# Patient Record
Sex: FEMALE | Race: WHITE | NOT HISPANIC OR LATINO | Employment: OTHER | URBAN - METROPOLITAN AREA
[De-identification: names, ages, dates, MRNs, and addresses within clinical notes are randomized per-mention and may not be internally consistent; named-entity substitution may affect disease eponyms.]

---

## 2017-12-14 ENCOUNTER — HOSPITAL ENCOUNTER (OUTPATIENT)
Dept: RADIOLOGY | Facility: HOSPITAL | Age: 72
Discharge: HOME | End: 2017-12-14
Attending: OBSTETRICS & GYNECOLOGY
Payer: MEDICARE

## 2017-12-14 DIAGNOSIS — Z12.39 ENCOUNTER FOR SPECIAL SCREENING EXAMINATION FOR NEOPLASM OF BREAST: ICD-10-CM

## 2017-12-14 PROCEDURE — 77063 BREAST TOMOSYNTHESIS BI: CPT | Mod: 26,,, | Performed by: RADIOLOGY

## 2017-12-14 PROCEDURE — 77067 SCR MAMMO BI INCL CAD: CPT | Mod: 26,,, | Performed by: RADIOLOGY

## 2017-12-14 PROCEDURE — 77067 SCR MAMMO BI INCL CAD: CPT | Mod: TC,PO

## 2018-12-17 ENCOUNTER — HOSPITAL ENCOUNTER (OUTPATIENT)
Dept: RADIOLOGY | Facility: HOSPITAL | Age: 73
Discharge: HOME | End: 2018-12-17
Attending: OBSTETRICS & GYNECOLOGY
Payer: MEDICARE

## 2018-12-17 DIAGNOSIS — Z12.39 ENCOUNTER FOR SPECIAL SCREENING EXAMINATION FOR NEOPLASM OF BREAST: ICD-10-CM

## 2018-12-17 PROCEDURE — 77063 BREAST TOMOSYNTHESIS BI: CPT | Mod: TC,PO

## 2018-12-17 PROCEDURE — 77063 BREAST TOMOSYNTHESIS BI: CPT | Mod: 26,,, | Performed by: RADIOLOGY

## 2018-12-17 PROCEDURE — 77067 SCR MAMMO BI INCL CAD: CPT | Mod: TC,PO

## 2018-12-17 PROCEDURE — 77067 SCR MAMMO BI INCL CAD: CPT | Mod: 26,,, | Performed by: RADIOLOGY

## 2018-12-20 ENCOUNTER — TELEPHONE (OUTPATIENT)
Dept: RADIOLOGY | Facility: HOSPITAL | Age: 73
End: 2018-12-20

## 2019-12-21 ENCOUNTER — HOSPITAL ENCOUNTER (OUTPATIENT)
Dept: RADIOLOGY | Facility: HOSPITAL | Age: 74
Discharge: HOME | End: 2019-12-21
Attending: OBSTETRICS & GYNECOLOGY
Payer: MEDICARE

## 2019-12-21 DIAGNOSIS — Z12.31 ENCOUNTER FOR SCREENING MAMMOGRAM FOR MALIGNANT NEOPLASM OF BREAST: ICD-10-CM

## 2019-12-21 PROCEDURE — 77067 SCR MAMMO BI INCL CAD: CPT | Mod: 26,,, | Performed by: RADIOLOGY

## 2019-12-21 PROCEDURE — 77063 BREAST TOMOSYNTHESIS BI: CPT | Mod: TC,PO

## 2019-12-21 PROCEDURE — 77063 MAMMO DIGITAL SCREENING BILAT WITH TOMOSYNTHESIS_CAD: ICD-10-PCS | Mod: 26,,, | Performed by: RADIOLOGY

## 2019-12-21 PROCEDURE — 77063 BREAST TOMOSYNTHESIS BI: CPT | Mod: 26,,, | Performed by: RADIOLOGY

## 2019-12-21 PROCEDURE — 77067 MAMMO DIGITAL SCREENING BILAT WITH TOMOSYNTHESIS_CAD: ICD-10-PCS | Mod: 26,,, | Performed by: RADIOLOGY

## 2020-11-24 RX ORDER — AMLODIPINE BESYLATE 2.5 MG/1
TABLET ORAL
COMMUNITY
Start: 2020-09-14 | End: 2020-11-24 | Stop reason: SDUPTHER

## 2020-11-24 RX ORDER — AMLODIPINE BESYLATE 2.5 MG/1
2.5 TABLET ORAL 2 TIMES DAILY
Qty: 180 TABLET | Refills: 3 | Status: SHIPPED | OUTPATIENT
Start: 2020-11-24 | End: 2021-11-30

## 2020-12-04 ENCOUNTER — OFFICE VISIT (OUTPATIENT)
Dept: CARDIOLOGY | Facility: CLINIC | Age: 75
End: 2020-12-04
Payer: MEDICARE

## 2020-12-04 VITALS
RESPIRATION RATE: 16 BRPM | WEIGHT: 158 LBS | HEART RATE: 68 BPM | BODY MASS INDEX: 31.02 KG/M2 | SYSTOLIC BLOOD PRESSURE: 128 MMHG | OXYGEN SATURATION: 98 % | HEIGHT: 60 IN | DIASTOLIC BLOOD PRESSURE: 80 MMHG

## 2020-12-04 DIAGNOSIS — F31.9 BIPOLAR 1 DISORDER: ICD-10-CM

## 2020-12-04 DIAGNOSIS — K22.4 ESOPHAGEAL SPASM: ICD-10-CM

## 2020-12-04 DIAGNOSIS — I25.119 ATHEROSCLEROSIS OF NATIVE CORONARY ARTERY OF NATIVE HEART WITH ANGINA PECTORIS: ICD-10-CM

## 2020-12-04 DIAGNOSIS — I10 ESSENTIAL HYPERTENSION: ICD-10-CM

## 2020-12-04 DIAGNOSIS — E11.40 TYPE 2 DIABETES MELLITUS WITH DIABETIC NEUROPATHY, WITHOUT LONG-TERM CURRENT USE OF INSULIN: ICD-10-CM

## 2020-12-04 DIAGNOSIS — K22.2 ESOPHAGEAL STRICTURE: ICD-10-CM

## 2020-12-04 DIAGNOSIS — Z00.00 ANNUAL PHYSICAL EXAM: ICD-10-CM

## 2020-12-04 DIAGNOSIS — E78.2 MIXED HYPERLIPIDEMIA: ICD-10-CM

## 2020-12-04 DIAGNOSIS — R00.2 PALPITATIONS: Primary | ICD-10-CM

## 2020-12-04 DIAGNOSIS — R94.31 NONSPECIFIC ABNORMAL ELECTROCARDIOGRAM (ECG) (EKG): ICD-10-CM

## 2020-12-04 DIAGNOSIS — E03.9 ACQUIRED HYPOTHYROIDISM: ICD-10-CM

## 2020-12-04 DIAGNOSIS — Z95.1 HX OF CABG: ICD-10-CM

## 2020-12-04 DIAGNOSIS — E08.65 DIABETES MELLITUS DUE TO UNDERLYING CONDITION WITH HYPERGLYCEMIA, WITHOUT LONG-TERM CURRENT USE OF INSULIN: ICD-10-CM

## 2020-12-04 PROCEDURE — 99204 PR OFFICE/OUTPT VISIT, NEW, LEVL IV, 45-59 MIN: ICD-10-PCS | Mod: 25,S$GLB,, | Performed by: INTERNAL MEDICINE

## 2020-12-04 PROCEDURE — 93000 ELECTROCARDIOGRAM COMPLETE: CPT | Mod: S$GLB,,, | Performed by: INTERNAL MEDICINE

## 2020-12-04 PROCEDURE — 93000 EKG 12-LEAD: ICD-10-PCS | Mod: S$GLB,,, | Performed by: INTERNAL MEDICINE

## 2020-12-04 PROCEDURE — 99204 OFFICE O/P NEW MOD 45 MIN: CPT | Mod: 25,S$GLB,, | Performed by: INTERNAL MEDICINE

## 2020-12-04 RX ORDER — ATENOLOL 25 MG/1
TABLET ORAL
Qty: 90 TABLET | Refills: 2 | Status: SHIPPED | OUTPATIENT
Start: 2020-12-04 | End: 2021-11-29

## 2020-12-04 RX ORDER — ATORVASTATIN CALCIUM 40 MG/1
40 TABLET, FILM COATED ORAL NIGHTLY
COMMUNITY
Start: 2020-11-24 | End: 2023-06-29 | Stop reason: SDUPTHER

## 2020-12-04 RX ORDER — SITAGLIPTIN AND METFORMIN HYDROCHLORIDE 500; 50 MG/1; MG/1
50-500 TABLET, FILM COATED ORAL DAILY
COMMUNITY
Start: 2020-11-24 | End: 2022-01-21 | Stop reason: ALTCHOICE

## 2020-12-04 RX ORDER — LOSARTAN POTASSIUM 50 MG/1
50 TABLET ORAL DAILY
COMMUNITY
Start: 2020-11-24 | End: 2024-01-05 | Stop reason: SDUPTHER

## 2020-12-04 RX ORDER — DULOXETIN HYDROCHLORIDE 30 MG/1
30 CAPSULE, DELAYED RELEASE ORAL NIGHTLY
COMMUNITY
Start: 2020-11-24

## 2020-12-04 RX ORDER — ALPRAZOLAM 1 MG/1
1 TABLET ORAL DAILY
COMMUNITY
Start: 2020-11-05

## 2020-12-04 RX ORDER — NITROGLYCERIN 0.4 MG/1
0.4 TABLET SUBLINGUAL EVERY 5 MIN PRN
Qty: 30 TABLET | Refills: 6 | Status: SHIPPED | OUTPATIENT
Start: 2020-12-04 | End: 2024-01-05 | Stop reason: SDUPTHER

## 2020-12-04 RX ORDER — CLOPIDOGREL BISULFATE 75 MG/1
75 TABLET ORAL DAILY
COMMUNITY
Start: 2020-11-24 | End: 2021-02-22

## 2020-12-04 RX ORDER — LEVOTHYROXINE SODIUM 88 UG/1
88 TABLET ORAL DAILY
COMMUNITY
Start: 2020-09-14

## 2020-12-04 RX ORDER — GLIMEPIRIDE 1 MG/1
1 TABLET ORAL DAILY
COMMUNITY
Start: 2020-10-06

## 2020-12-04 NOTE — PROGRESS NOTES
Subjective:    Patient ID:  Maylin Birmingham is a 75 y.o. female who presents for follow-up of   Chief Complaint   Patient presents with    Hypertension       HPI:   Ms Maylin Birmingham is a 75 y.o. female is here for a follow-up.  She has been doing well no specific complaints at the present time.  Her breathing is good denies any shortness of breath but she has mild dyspnea on exertion.  Denies any chest pain or tightness or heaviness.  But she occasionally has fluttering in the chest.  And her fluttering sometimes bothers her.  Denies any dizziness or lightheadedness denies any loss of consciousness falls or head injury she had 1 episode where she felt very dizzy and laid there for a couple of hours are till her dizziness went away.  After that she has not had any other episodes.  Patient has esophageal spasm and when she has a spasm the thing that helps her the most is nitroglycerin sublingual tablets and Sprite.    Review of patient's allergies indicates:  No Known Allergies    Past Medical History:   Diagnosis Date    Bradycardia     CAD (coronary artery disease)     Diabetes     HTN (hypertension)      Past Surgical History:   Procedure Laterality Date    INSERT / REPLACE / REMOVE PACEMAKER       Social History     Tobacco Use    Smoking status: Never Smoker    Smokeless tobacco: Never Used   Substance Use Topics    Alcohol use: Never     Frequency: Never    Drug use: Never     Family History   Problem Relation Age of Onset    Breast cancer Maternal Aunt     Breast cancer Sister     Breast cancer Sister     Diabetes Mother         Review of Systems:   Constitution: Negative for diaphoresis and fever.   HEENT: Negative for nosebleeds.    Cardiovascular: Negative for chest pain       No dyspnea on exertion       No leg swelling        Intermittent palpitations  Respiratory: Negative for shortness of breath and wheezing.    Hematologic/Lymphatic: Negative for bleeding problem. Does not bruise/bleed  easily.   Skin: Negative for color change and rash.   Musculoskeletal: Negative for falls and myalgias.   Gastrointestinal: Negative for hematemesis and hematochezia.   Genitourinary: Negative for hematuria.   Neurological: Negative for dizziness and light-headedness.   Psychiatric/Behavioral: Negative for altered mental status and memory loss.          Objective:        Vitals:    12/04/20 1011   BP: 128/80   Pulse: 68   Resp: 16       No results found for: WBC, HGB, HCT, PLT, CHOL, TRIG, HDL, LDLDIRECT, ALT, AST, NA, K, CL, CREATININE, BUN, CO2, TSH, PSA, INR, GLUF, HGBA1C, MICROALBUR     ECHOCARDIOGRAM RESULTS  No results found for this or any previous visit.      CURRENT/PREVIOUS VISIT EKG  No results found for this or any previous visit.  No procedure found.   No results found for this or any previous visit.    Physical Exam:  CONSTITUTIONAL: No fever, no chills  HEENT: Normocephalic, atraumatic,pupils reactive to light                 NECK:  No JVD no carotid bruit  CVS: S1S2+, RRR, no murmurs,   LUNGS: Clear  ABDOMEN: Soft, NT, BS+  EXTREMITIES: No cyanosis, edema  : No olivia catheter  NEURO: AAO X 3  PSY: Normal affect      Medication List with Changes/Refills   Current Medications    ALPRAZOLAM (XANAX) 1 MG TABLET    Take 1 mg by mouth once daily.    AMLODIPINE (NORVASC) 2.5 MG TABLET    Take 1 tablet (2.5 mg total) by mouth 2 (two) times daily.    ATENOLOL (TENORMIN) 25 MG TABLET    TAKE (1) TABLET BY MOUTH ONCE DAILY.    ATORVASTATIN (LIPITOR) 40 MG TABLET    Take 40 mg by mouth every evening.    CLOPIDOGREL (PLAVIX) 75 MG TABLET    Take 75 mg by mouth once daily.    DULOXETINE (CYMBALTA) 30 MG CAPSULE    Take 30 mg by mouth every evening.    GLIMEPIRIDE (AMARYL) 1 MG TABLET    Take 1 mg by mouth once daily.    JANUMET  MG PER TABLET    Take  tablets by mouth once daily.    LOSARTAN (COZAAR) 50 MG TABLET    Take 50 mg by mouth once daily.    SYNTHROID 88 MCG TABLET    Take 88 mcg by mouth  once daily.         Assessment:       1. Palpitations    2. Esophageal spasm    3. Type 2 diabetes mellitus with diabetic neuropathy, without long-term current use of insulin    4. Atherosclerosis of native coronary artery of native heart with angina pectoris    5. Hx of CABG    6. Mixed hyperlipidemia    7. Diabetes mellitus due to underlying condition with hyperglycemia, without long-term current use of insulin    8. Essential hypertension    9. Nonspecific abnormal electrocardiogram (ECG) (EKG)    10. Acquired hypothyroidism    11. Esophageal stricture    12. Annual physical exam    13. Bipolar 1 disorder         Plan:    1.  Patient is doing very well her blood pressure is well controlled 128/80.  And she is currently on amlodipine 2.5 mg p.o. Q b.i.d. and 10 or min 25 mg p.o. q.day. Continue the same she is also on losartan 50 mg p.o. q.day continue the same.  2.  She is on Amaryl 1 mg p.o. q.day for her diabetes and also Janumet  mg tablet.  Continue the same.  Her neuropathy is still creating a problem for her.  Patient is taking vitamin B12 and it has helped a little bit.  3.  She is stable from her coronary artery disease status.  Will schedule her for a 2D echocardiogram for LV function ejection fraction and wall motion abnormality.  Patient has intermittent palpitations.  And will do a 24 hr Holter monitor.  She also has intermittent esophageal spasm which is relieved with sublingual nitroglycerin and Sprite.  Continue the same.  4.  She is currently on Synthroid 88 mcg p.o. q.day continue the same and primary physician Dr. billingsley checking her TSH and free T4 levels.  5.  She is currently on Plavix 75 mg p.o. q.day.  Continue the same no bleeding issues no blood in the stool or urine  6.  She takes Xanax 1 mg tablet as needed for her anxiety.  7.  She is on Cymbalta 30 mg p.o. q.day continue the same.  She is tolerating it well.  Her QT interval is within normal limits measuring QT is 424 and  QTC is 450 milliseconds.  8.  Patient to continue to exercise and walk on regular basis.  And I will see her back in the office in 6 months.        Problem List Items Addressed This Visit        Psychiatric    Bipolar 1 disorder       Cardiac/Vascular    Atherosclerosis of native coronary artery of native heart with angina pectoris    Hx of CABG    Mixed hyperlipidemia    Essential hypertension    Nonspecific abnormal electrocardiogram (ECG) (EKG)       Endocrine    Type 2 diabetes mellitus with diabetic neuropathy, without long-term current use of insulin    Relevant Medications    glimepiride (AMARYL) 1 MG tablet    JANUMET  mg per tablet    Diabetes mellitus due to underlying condition with hyperglycemia, without long-term current use of insulin    Relevant Medications    glimepiride (AMARYL) 1 MG tablet    JANUMET  mg per tablet    Acquired hypothyroidism       GI    Esophageal stricture       Other    Annual physical exam    Relevant Orders    IN OFFICE EKG 12-LEAD (to [facility])      Other Visit Diagnoses     Palpitations    -  Primary    Esophageal spasm              Follow up in about 6 months (around 6/4/2021).

## 2020-12-22 ENCOUNTER — HOSPITAL ENCOUNTER (OUTPATIENT)
Dept: RADIOLOGY | Facility: HOSPITAL | Age: 75
Discharge: HOME | End: 2020-12-22
Attending: OBSTETRICS & GYNECOLOGY
Payer: MEDICARE

## 2020-12-22 DIAGNOSIS — Z12.31 SCREENING MAMMOGRAM, ENCOUNTER FOR: ICD-10-CM

## 2020-12-22 PROCEDURE — 77067 SCR MAMMO BI INCL CAD: CPT | Mod: TC,PO

## 2020-12-22 PROCEDURE — 77063 MAMMO DIGITAL SCREENING BILAT WITH TOMO: ICD-10-PCS | Mod: 26,,, | Performed by: RADIOLOGY

## 2020-12-22 PROCEDURE — 77067 MAMMO DIGITAL SCREENING BILAT WITH TOMO: ICD-10-PCS | Mod: 26,,, | Performed by: RADIOLOGY

## 2020-12-22 PROCEDURE — 77063 BREAST TOMOSYNTHESIS BI: CPT | Mod: 26,,, | Performed by: RADIOLOGY

## 2020-12-22 PROCEDURE — 77067 SCR MAMMO BI INCL CAD: CPT | Mod: 26,,, | Performed by: RADIOLOGY

## 2021-01-22 DIAGNOSIS — I25.119 ATHEROSCLEROSIS OF NATIVE CORONARY ARTERY OF NATIVE HEART WITH ANGINA PECTORIS: Primary | ICD-10-CM

## 2021-03-08 PROBLEM — Z00.00 ANNUAL PHYSICAL EXAM: Status: RESOLVED | Noted: 2020-12-04 | Resolved: 2021-03-08

## 2021-05-20 ENCOUNTER — HOSPITAL ENCOUNTER (OUTPATIENT)
Dept: CARDIOLOGY | Facility: CLINIC | Age: 76
Discharge: HOME | End: 2021-05-20
Attending: INTERNAL MEDICINE
Payer: MEDICARE

## 2021-05-20 VITALS — BODY MASS INDEX: 31.02 KG/M2 | WEIGHT: 158 LBS | HEIGHT: 60 IN

## 2021-05-20 DIAGNOSIS — I25.119 ATHEROSCLEROSIS OF NATIVE CORONARY ARTERY OF NATIVE HEART WITH ANGINA PECTORIS: ICD-10-CM

## 2021-05-20 PROCEDURE — 93306 ECHO (CUPID ONLY): ICD-10-PCS | Mod: S$GLB,,, | Performed by: INTERNAL MEDICINE

## 2021-05-20 PROCEDURE — 93306 TTE W/DOPPLER COMPLETE: CPT | Mod: S$GLB,,, | Performed by: INTERNAL MEDICINE

## 2021-05-20 RX ORDER — CLOPIDOGREL BISULFATE 75 MG/1
TABLET ORAL
Qty: 90 TABLET | Refills: 3 | Status: SHIPPED | OUTPATIENT
Start: 2021-05-20 | End: 2022-07-02

## 2021-06-11 ENCOUNTER — OFFICE VISIT (OUTPATIENT)
Dept: CARDIOLOGY | Facility: CLINIC | Age: 76
End: 2021-06-11
Payer: MEDICARE

## 2021-06-11 VITALS
OXYGEN SATURATION: 98 % | RESPIRATION RATE: 16 BRPM | WEIGHT: 158 LBS | HEIGHT: 60 IN | DIASTOLIC BLOOD PRESSURE: 84 MMHG | BODY MASS INDEX: 31.02 KG/M2 | SYSTOLIC BLOOD PRESSURE: 136 MMHG | HEART RATE: 61 BPM

## 2021-06-11 DIAGNOSIS — F31.9 BIPOLAR 1 DISORDER: ICD-10-CM

## 2021-06-11 DIAGNOSIS — I07.1 MODERATE TRICUSPID REGURGITATION: ICD-10-CM

## 2021-06-11 DIAGNOSIS — I27.29 OTHER SECONDARY PULMONARY HYPERTENSION: Primary | ICD-10-CM

## 2021-06-11 DIAGNOSIS — K22.2 ESOPHAGEAL STRICTURE: ICD-10-CM

## 2021-06-11 DIAGNOSIS — I51.7 MILD LEFT ATRIAL ENLARGEMENT: ICD-10-CM

## 2021-06-11 DIAGNOSIS — E11.40 TYPE 2 DIABETES MELLITUS WITH DIABETIC NEUROPATHY, WITHOUT LONG-TERM CURRENT USE OF INSULIN: ICD-10-CM

## 2021-06-11 DIAGNOSIS — I10 ESSENTIAL HYPERTENSION: ICD-10-CM

## 2021-06-11 DIAGNOSIS — R94.31 NONSPECIFIC ABNORMAL ELECTROCARDIOGRAM (ECG) (EKG): ICD-10-CM

## 2021-06-11 DIAGNOSIS — E78.2 MIXED HYPERLIPIDEMIA: ICD-10-CM

## 2021-06-11 DIAGNOSIS — E03.9 ACQUIRED HYPOTHYROIDISM: ICD-10-CM

## 2021-06-11 DIAGNOSIS — Z95.1 HX OF CABG: ICD-10-CM

## 2021-06-11 DIAGNOSIS — I25.119 ATHEROSCLEROSIS OF NATIVE CORONARY ARTERY OF NATIVE HEART WITH ANGINA PECTORIS: ICD-10-CM

## 2021-06-11 DIAGNOSIS — I51.7 RIGHT ATRIAL ENLARGEMENT: ICD-10-CM

## 2021-06-11 PROCEDURE — 99214 PR OFFICE/OUTPT VISIT, EST, LEVL IV, 30-39 MIN: ICD-10-PCS | Mod: S$GLB,,, | Performed by: INTERNAL MEDICINE

## 2021-06-11 PROCEDURE — 99214 OFFICE O/P EST MOD 30 MIN: CPT | Mod: S$GLB,,, | Performed by: INTERNAL MEDICINE

## 2021-09-02 LAB
AORTIC ROOT ANNULUS: 3.31 CM
AORTIC VALVE CUSP SEPERATION: 1.55 CM
AV INDEX (PROSTH): 0.87
AV MEAN GRADIENT: 2 MMHG
AV PEAK GRADIENT: 3 MMHG
AV VALVE AREA: 3.11 CM2
AV VELOCITY RATIO: 1
BSA FOR ECHO PROCEDURE: 1.74 M2
CV ECHO LV RWT: 0.4 CM
DOP CALC AO PEAK VEL: 0.8 M/S
DOP CALC AO VTI: 21.45 CM
DOP CALC LVOT AREA: 3.6 CM2
DOP CALC LVOT DIAMETER: 2.13 CM
DOP CALC LVOT PEAK VEL: 0.8 M/S
DOP CALC LVOT STROKE VOLUME: 66.71 CM3
DOP CALCLVOT PEAK VEL VTI: 18.73 CM
E WAVE DECELERATION TIME: 162.39 MSEC
E/A RATIO: 1.36
ECHO LV POSTERIOR WALL: 0.71 CM (ref 0.6–1.1)
EJECTION FRACTION: 70 %
INTERVENTRICULAR SEPTUM: 0.74 CM (ref 0.6–1.1)
IVRT: 110.73 MSEC
LEFT ATRIUM SIZE: 3.37 CM
LEFT VENTRICLE DIASTOLIC VOLUME INDEX: 31.4 ML/M2
LEFT VENTRICLE DIASTOLIC VOLUME: 53.06 ML
LEFT VENTRICLE MASS INDEX: 40 G/M2
LEFT VENTRICULAR INTERNAL DIMENSION IN DIASTOLE: 3.56 CM (ref 3.5–6)
LEFT VENTRICULAR MASS: 67.69 G
MV PEAK A VEL: 0.66 M/S
MV PEAK E VEL: 0.9 M/S
MV STENOSIS PRESSURE HALF TIME: 47.09 MS
MV VALVE AREA P 1/2 METHOD: 4.67 CM2
PISA TR MAX VEL: 3.03 M/S
RIGHT VENTRICULAR END-DIASTOLIC DIMENSION: 1.93 CM
TR MAX PG: 37 MMHG

## 2021-12-17 ENCOUNTER — OFFICE VISIT (OUTPATIENT)
Dept: OBSTETRICS AND GYNECOLOGY | Facility: CLINIC | Age: 76
End: 2021-12-17
Payer: MEDICARE

## 2021-12-17 VITALS
BODY MASS INDEX: 30.31 KG/M2 | DIASTOLIC BLOOD PRESSURE: 76 MMHG | HEIGHT: 60 IN | WEIGHT: 154.38 LBS | SYSTOLIC BLOOD PRESSURE: 124 MMHG

## 2021-12-17 DIAGNOSIS — Z01.419 ENCOUNTER FOR WELL WOMAN EXAM WITH ROUTINE GYNECOLOGICAL EXAM: Primary | ICD-10-CM

## 2021-12-17 PROCEDURE — G0101 PR CA SCREEN;PELVIC/BREAST EXAM: ICD-10-PCS | Mod: S$GLB,,, | Performed by: OBSTETRICS & GYNECOLOGY

## 2021-12-17 PROCEDURE — G0101 CA SCREEN;PELVIC/BREAST EXAM: HCPCS | Mod: S$GLB,,, | Performed by: OBSTETRICS & GYNECOLOGY

## 2021-12-17 RX ORDER — IBUPROFEN 600 MG/1
600 TABLET ORAL 3 TIMES DAILY
COMMUNITY
Start: 2021-09-09

## 2021-12-17 RX ORDER — EMPAGLIFLOZIN 10 MG/1
10 TABLET, FILM COATED ORAL DAILY
COMMUNITY
Start: 2021-11-16 | End: 2022-01-21 | Stop reason: ALTCHOICE

## 2021-12-27 ENCOUNTER — HOSPITAL ENCOUNTER (OUTPATIENT)
Dept: RADIOLOGY | Facility: CLINIC | Age: 76
Discharge: HOME | End: 2021-12-27
Payer: MEDICARE

## 2021-12-27 DIAGNOSIS — Z12.31 ENCOUNTER FOR SCREENING MAMMOGRAM FOR MALIGNANT NEOPLASM OF BREAST: ICD-10-CM

## 2021-12-27 PROCEDURE — 77063 BREAST TOMOSYNTHESIS BI: CPT | Mod: S$GLB,,, | Performed by: RADIOLOGY

## 2021-12-27 PROCEDURE — 77063 MAMMO DIGITAL SCREENING BILAT WITH TOMO: ICD-10-PCS | Mod: S$GLB,,, | Performed by: RADIOLOGY

## 2021-12-27 PROCEDURE — 77067 SCR MAMMO BI INCL CAD: CPT | Mod: S$GLB,,, | Performed by: RADIOLOGY

## 2021-12-27 PROCEDURE — 77067 MAMMO DIGITAL SCREENING BILAT WITH TOMO: ICD-10-PCS | Mod: S$GLB,,, | Performed by: RADIOLOGY

## 2022-01-21 ENCOUNTER — OFFICE VISIT (OUTPATIENT)
Dept: CARDIOLOGY | Facility: CLINIC | Age: 77
End: 2022-01-21
Payer: MEDICARE

## 2022-01-21 VITALS
DIASTOLIC BLOOD PRESSURE: 72 MMHG | HEART RATE: 72 BPM | BODY MASS INDEX: 29.45 KG/M2 | SYSTOLIC BLOOD PRESSURE: 126 MMHG | WEIGHT: 150 LBS | HEIGHT: 60 IN | OXYGEN SATURATION: 100 %

## 2022-01-21 DIAGNOSIS — I07.1 MODERATE TRICUSPID REGURGITATION: ICD-10-CM

## 2022-01-21 DIAGNOSIS — E03.9 ACQUIRED HYPOTHYROIDISM: ICD-10-CM

## 2022-01-21 DIAGNOSIS — I10 ESSENTIAL HYPERTENSION: Primary | ICD-10-CM

## 2022-01-21 DIAGNOSIS — I25.119 ATHEROSCLEROSIS OF NATIVE CORONARY ARTERY OF NATIVE HEART WITH ANGINA PECTORIS: ICD-10-CM

## 2022-01-21 DIAGNOSIS — I51.7 MILD LEFT ATRIAL ENLARGEMENT: ICD-10-CM

## 2022-01-21 DIAGNOSIS — Z95.1 HX OF CABG: ICD-10-CM

## 2022-01-21 DIAGNOSIS — K22.2 ESOPHAGEAL STRICTURE: ICD-10-CM

## 2022-01-21 DIAGNOSIS — R94.31 NONSPECIFIC ABNORMAL ELECTROCARDIOGRAM (ECG) (EKG): ICD-10-CM

## 2022-01-21 DIAGNOSIS — E11.40 TYPE 2 DIABETES MELLITUS WITH DIABETIC NEUROPATHY, WITHOUT LONG-TERM CURRENT USE OF INSULIN: ICD-10-CM

## 2022-01-21 DIAGNOSIS — I27.29 OTHER SECONDARY PULMONARY HYPERTENSION: ICD-10-CM

## 2022-01-21 DIAGNOSIS — E78.2 MIXED HYPERLIPIDEMIA: ICD-10-CM

## 2022-01-21 PROCEDURE — 99214 OFFICE O/P EST MOD 30 MIN: CPT | Mod: S$GLB,,, | Performed by: INTERNAL MEDICINE

## 2022-01-21 PROCEDURE — 93000 ELECTROCARDIOGRAM COMPLETE: CPT | Mod: S$GLB,,, | Performed by: INTERNAL MEDICINE

## 2022-01-21 PROCEDURE — 93000 EKG 12-LEAD: ICD-10-PCS | Mod: S$GLB,,, | Performed by: INTERNAL MEDICINE

## 2022-01-21 PROCEDURE — 99214 PR OFFICE/OUTPT VISIT, EST, LEVL IV, 30-39 MIN: ICD-10-PCS | Mod: S$GLB,,, | Performed by: INTERNAL MEDICINE

## 2022-01-21 NOTE — PROGRESS NOTES
Subjective:    Patient ID:  Maylin Birmingham is a 76 y.o. female     Chief Complaint   Patient presents with    Follow-up     6 month     Hypertension    Hyperlipidemia       HPI:  Ms Maylin Birmingham is a 76 y.o. female is here for follow-up.  Patient has been doing well and patient had a cyst in the back of the neck or for which she was treated.  And also patient realized that she has lot more sensitive to the coloring of the pills and lately she has been having bladder issues.  She denies any chest pain or tightness or heaviness her breathing is good denies any shortness of breath or difficulty in breathing denies any dizziness or lightheadedness or loss of consciousness falls or head injury.  She has been taking all her medications regularly.  She was on Jardiance and could not tolerated it was changed to Trulicity.      Review of patient's allergies indicates:  No Known Allergies    Past Medical History:   Diagnosis Date    Bradycardia     CAD (coronary artery disease)     Diabetes     HTN (hypertension)      Past Surgical History:   Procedure Laterality Date    CATARACT EXTRACTION Bilateral     CORONARY ARTERY BYPASS GRAFT      HYSTERECTOMY      INSERT / REPLACE / REMOVE PACEMAKER       Social History     Tobacco Use    Smoking status: Never Smoker    Smokeless tobacco: Never Used   Substance Use Topics    Alcohol use: Never    Drug use: Never     Family History   Problem Relation Age of Onset    Breast cancer Maternal Aunt     Breast cancer Sister     Breast cancer Sister     Diabetes Mother         Review of Systems:   Constitution: Negative for diaphoresis and fever.   HEENT: Negative for nosebleeds.    Cardiovascular: Negative for chest pain       No dyspnea on exertion       No leg swelling        No palpitations  Respiratory: Negative for shortness of breath and wheezing.    Hematologic/Lymphatic: Negative for bleeding problem. Does not bruise/bleed easily.   Skin: Negative for color  change and rash.   Musculoskeletal: Negative for falls and myalgias.   Gastrointestinal: Negative for hematemesis and hematochezia.   Genitourinary: Negative for hematuria.   Neurological: Negative for dizziness and light-headedness.   Psychiatric/Behavioral: Negative for altered mental status and memory loss.          Objective:        Vitals:    01/21/22 0842   BP: 126/72   Pulse: 72       No results found for: WBC, HGB, HCT, PLT, CHOL, TRIG, HDL, LDLDIRECT, ALT, AST, NA, K, CL, CREATININE, BUN, CO2, TSH, PSA, INR, GLUF, HGBA1C, MICROALBUR     ECHOCARDIOGRAM RESULTS  Results for orders placed during the hospital encounter of 05/20/21    Echo Color Flow Doppler? Yes    Interpretation Summary  · The left ventricle is normal in size with normal systolic function.  · Normal left ventricular diastolic function.  · The estimated ejection fraction is 70%.  · Normal right ventricular size with normal right ventricular systolic function.  · Moderate to severe tricuspid regurgitation.  · Mild mitral regurgitation.  · There is mild pulmonary hypertension.        CURRENT/PREVIOUS VISIT EKG  Results for orders placed or performed in visit on 12/04/20   IN OFFICE EKG 12-LEAD (to [place])    Collection Time: 12/04/20 10:19 AM    Narrative    Test Reason : Z00.00,    Vent. Rate : 068 BPM     Atrial Rate : 068 BPM     P-R Int : 182 ms          QRS Dur : 110 ms      QT Int : 424 ms       P-R-T Axes : 086 083 079 degrees     QTc Int : 450 ms    Normal sinus rhythm  Incomplete right bundle branch block  Borderline Abnormal ECG  No previous ECGs available  Confirmed by Slade Oconnor MD ([id number]) on 12/10/2020 8:18:44 PM    Referred By:             Confirmed By:Slade Oconnor MD     No valid procedures specified.   No results found for this or any previous visit.      Physical Exam:  CONSTITUTIONAL: No fever, no chills  HEENT: Normocephalic, atraumatic,pupils reactive to light                 NECK:  No JVD no carotid bruit  CVS: S1S2+, RRR,  soft systolic murmurs,   LUNGS: Clear  ABDOMEN: Soft, NT, BS+  EXTREMITIES: No cyanosis, edema  : No olivia catheter  NEURO: AAO X 3  PSY: Normal affect      Medication List with Changes/Refills   Current Medications    ALPRAZOLAM (XANAX) 1 MG TABLET    Take 1 mg by mouth once daily.    AMLODIPINE (NORVASC) 2.5 MG TABLET    TAKE (1) TABLET BY MOUTH TWICE A DAY.    ATENOLOL (TENORMIN) 25 MG TABLET    TAKE (1) TABLET BY MOUTH ONCE DAILY.    ATORVASTATIN (LIPITOR) 40 MG TABLET    Take 40 mg by mouth every evening.    CLOPIDOGREL (PLAVIX) 75 MG TABLET    TAKE (1) TABLET BY MOUTH ONCE DAILY.    DULOXETINE (CYMBALTA) 30 MG CAPSULE    Take 30 mg by mouth every evening.    GLIMEPIRIDE (AMARYL) 1 MG TABLET    Take 1 mg by mouth once daily.    IBUPROFEN (ADVIL,MOTRIN) 600 MG TABLET    Take 600 mg by mouth 3 (three) times daily.    JANUMET  MG PER TABLET    Take  tablets by mouth once daily.    JARDIANCE 10 MG TABLET    Take 10 mg by mouth once daily.    LOSARTAN (COZAAR) 50 MG TABLET    Take 50 mg by mouth once daily.    NITROGLYCERIN (NITROSTAT) 0.4 MG SL TABLET    Place 1 tablet (0.4 mg total) under the tongue every 5 (five) minutes as needed for Chest pain.    SYNTHROID 88 MCG TABLET    Take 88 mcg by mouth once daily.             Assessment:       1. Essential hypertension    2. Atherosclerosis of native coronary artery of native heart with angina pectoris    3. Hx of CABG    4. Mixed hyperlipidemia    5. Nonspecific abnormal electrocardiogram (ECG) (EKG)    6. Mild left atrial enlargement    7. Moderate tricuspid regurgitation    8. Type 2 diabetes mellitus with diabetic neuropathy, without long-term current use of insulin    9. Acquired hypothyroidism    10. Other secondary pulmonary hypertension    11. Esophageal stricture         Plan:   1. Patient's blood pressure is well controlled is 126/72.  And she is currently on losartan 50 mg p.o. q.day continue the same and she is also on amlodipine 2.5 mg p.o.  q.day in atenolol 25 mg p.o. q.day continue the same.  2. Patient is diabetic is on Trulicity at the present time and she is doing well with.  She is off of Janumet and Jardiance.  Her primary physician is readjusting her medication.  3. She is on atorvastatin 40 mg p.o. q.day continue the same.  And a primary physician is checking her cholesterol and liver function test.  4. She is on Plavix 75 mg p.o. q.day continue the same no bleeding issues no blood in the stool urine.  5. Reviewed her EKG independently patient is in normal sinus rhythm with heart rate of 65 beats per minute incomplete right bundle branch block essentially no significant change from the previous EKG.  6. Patient takes Cymbalta 30 mg p.o. q.day continue the same.  7. Patient has bladder issues and and she is following up with the urologist.  8. Continue her current management and I will see her back in the office in 6 months time.          Problem List Items Addressed This Visit        Pulmonary    Other secondary pulmonary hypertension       Cardiac/Vascular    Atherosclerosis of native coronary artery of native heart with angina pectoris    Hx of CABG    Mixed hyperlipidemia    Essential hypertension - Primary    Relevant Orders    IN OFFICE EKG 12-LEAD (to [facility])    Nonspecific abnormal electrocardiogram (ECG) (EKG)    Mild left atrial enlargement    Moderate tricuspid regurgitation       Endocrine    Type 2 diabetes mellitus with diabetic neuropathy, without long-term current use of insulin    Acquired hypothyroidism       GI    Esophageal stricture          No follow-ups on file.

## 2022-07-22 ENCOUNTER — OFFICE VISIT (OUTPATIENT)
Dept: CARDIOLOGY | Facility: CLINIC | Age: 77
End: 2022-07-22
Payer: MEDICARE

## 2022-07-22 VITALS
SYSTOLIC BLOOD PRESSURE: 118 MMHG | WEIGHT: 145 LBS | HEART RATE: 65 BPM | HEIGHT: 60 IN | OXYGEN SATURATION: 97 % | BODY MASS INDEX: 28.47 KG/M2 | RESPIRATION RATE: 16 BRPM | DIASTOLIC BLOOD PRESSURE: 66 MMHG

## 2022-07-22 DIAGNOSIS — K22.2 ESOPHAGEAL STRICTURE: ICD-10-CM

## 2022-07-22 DIAGNOSIS — I51.7 MILD LEFT ATRIAL ENLARGEMENT: ICD-10-CM

## 2022-07-22 DIAGNOSIS — E78.2 MIXED HYPERLIPIDEMIA: ICD-10-CM

## 2022-07-22 DIAGNOSIS — I27.29 OTHER SECONDARY PULMONARY HYPERTENSION: ICD-10-CM

## 2022-07-22 DIAGNOSIS — Z95.1 HX OF CABG: Primary | ICD-10-CM

## 2022-07-22 DIAGNOSIS — R94.31 NONSPECIFIC ABNORMAL ELECTROCARDIOGRAM (ECG) (EKG): ICD-10-CM

## 2022-07-22 DIAGNOSIS — I25.10 CAD IN NATIVE ARTERY: ICD-10-CM

## 2022-07-22 DIAGNOSIS — I10 ESSENTIAL HYPERTENSION: ICD-10-CM

## 2022-07-22 DIAGNOSIS — I07.1 MODERATE TRICUSPID REGURGITATION: ICD-10-CM

## 2022-07-22 DIAGNOSIS — E11.40 TYPE 2 DIABETES MELLITUS WITH DIABETIC NEUROPATHY, WITHOUT LONG-TERM CURRENT USE OF INSULIN: ICD-10-CM

## 2022-07-22 DIAGNOSIS — E03.9 ACQUIRED HYPOTHYROIDISM: ICD-10-CM

## 2022-07-22 PROCEDURE — 99215 OFFICE O/P EST HI 40 MIN: CPT | Mod: S$GLB,,, | Performed by: INTERNAL MEDICINE

## 2022-07-22 PROCEDURE — 93000 ELECTROCARDIOGRAM COMPLETE: CPT | Mod: S$GLB,,, | Performed by: INTERNAL MEDICINE

## 2022-07-22 PROCEDURE — 99215 PR OFFICE/OUTPT VISIT, EST, LEVL V, 40-54 MIN: ICD-10-PCS | Mod: S$GLB,,, | Performed by: INTERNAL MEDICINE

## 2022-07-22 PROCEDURE — 93000 EKG 12-LEAD: ICD-10-PCS | Mod: S$GLB,,, | Performed by: INTERNAL MEDICINE

## 2022-07-22 RX ORDER — PIOGLITAZONEHYDROCHLORIDE 15 MG/1
15 TABLET ORAL DAILY
COMMUNITY
Start: 2022-07-19

## 2022-07-22 RX ORDER — DULAGLUTIDE 0.75 MG/.5ML
INJECTION, SOLUTION SUBCUTANEOUS
COMMUNITY
Start: 2022-07-19 | End: 2022-12-09

## 2022-07-22 NOTE — PROGRESS NOTES
Subjective:    Patient ID:  Maylin Birmingham is a 77 y.o. female     Chief Complaint   Patient presents with    Follow-up     6 month fu       HPI:  Ms Maylin Birmingham is a 77 y.o. female here for follow-up.  Patient has been doing well except she has had issues with her diabetes medication.  She has been seeing her endocrinologist.  Patient was started on Trulicity at a higher dose and she could not tolerate it and it cause significant diarrhea and nausea and vomiting.  She did not feel well with and recently she was off Plavix for couple of days and patient had a good night sleep.  Patient denies any chest pain or tightness or heaviness but she has tenderness in the right breast feels like a lump for her.  She does have anxiety issues she has had anxiety attacks intermittently and she has taken Xanax which relieved her.  She has been taking all her other medications.      Review of patient's allergies indicates:  No Known Allergies    Past Medical History:   Diagnosis Date    Bradycardia     CAD (coronary artery disease)     Diabetes     HTN (hypertension)      Past Surgical History:   Procedure Laterality Date    CATARACT EXTRACTION Bilateral     CORONARY ARTERY BYPASS GRAFT      HYSTERECTOMY      INSERT / REPLACE / REMOVE PACEMAKER       Social History     Tobacco Use    Smoking status: Never Smoker    Smokeless tobacco: Never Used   Substance Use Topics    Alcohol use: Never    Drug use: Never     Family History   Problem Relation Age of Onset    Breast cancer Maternal Aunt     Breast cancer Sister     Breast cancer Sister     Diabetes Mother         Review of Systems:   Constitution: Negative for diaphoresis and fever.   HEENT: Negative for nosebleeds.    Cardiovascular: Negative for chest pain       No dyspnea on exertion       No leg swelling        No palpitations  Respiratory: Negative for shortness of breath and wheezing.    Hematologic/Lymphatic: Negative for bleeding problem. Does not  bruise/bleed easily.   Skin: Negative for color change and rash.   Musculoskeletal: Negative for falls and myalgias.   Gastrointestinal: Negative for hematemesis and hematochezia.   Genitourinary: Negative for hematuria.   Neurological: Negative for dizziness and light-headedness.   Psychiatric/Behavioral: Negative for altered mental status and memory loss.          Objective:        Vitals:    07/22/22 1120   BP: 118/66   Pulse: 65   Resp: 16       No results found for: WBC, HGB, HCT, PLT, CHOL, TRIG, HDL, LDLDIRECT, ALT, AST, NA, K, CL, CREATININE, BUN, CO2, TSH, PSA, INR, GLUF, HGBA1C, MICROALBUR     ECHOCARDIOGRAM RESULTS  Results for orders placed during the hospital encounter of 05/20/21    Echo Color Flow Doppler? Yes    Interpretation Summary  · The left ventricle is normal in size with normal systolic function.  · Normal left ventricular diastolic function.  · The estimated ejection fraction is 70%.  · Normal right ventricular size with normal right ventricular systolic function.  · Moderate to severe tricuspid regurgitation.  · Mild mitral regurgitation.  · There is mild pulmonary hypertension.        CURRENT/PREVIOUS VISIT EKG  Results for orders placed or performed in visit on 01/21/22   IN OFFICE EKG 12-LEAD (to [place])    Collection Time: 01/21/22  8:50 AM    Narrative    Test Reason : I10,    Vent. Rate : 065 BPM     Atrial Rate : 065 BPM     P-R Int : 182 ms          QRS Dur : 110 ms      QT Int : 434 ms       P-R-T Axes : 076 063 064 degrees     QTc Int : 451 ms    Normal sinus rhythm  Incomplete right bundle branch block  Borderline Abnormal ECG  When compared with ECG of 04-DEC-2020 10:19,  No significant change was found  Confirmed by Slade Oconnor MD ([id number]) on 2/27/2022 8:10:02 PM    Referred By:  GREYSON           Confirmed By:Slade Oconnor MD     No valid procedures specified.   No results found for this or any previous visit.      Physical Exam:  CONSTITUTIONAL: No fever, no chills  HEENT:  Normocephalic, atraumatic,pupils reactive to light                 NECK:  No JVD no carotid bruit  CVS: S1S2+, RRR, systolic murmurs,   LUNGS: Clear  ABDOMEN: Soft, NT, BS+  EXTREMITIES: No cyanosis, edema  : No olivia catheter  NEURO: AAO X 3  PSY: Normal affect      Medication List with Changes/Refills   Current Medications    ALPRAZOLAM (XANAX) 1 MG TABLET    Take 1 mg by mouth once daily.    AMLODIPINE (NORVASC) 2.5 MG TABLET    TAKE (1) TABLET BY MOUTH TWICE A DAY.    ATENOLOL (TENORMIN) 25 MG TABLET    TAKE (1) TABLET BY MOUTH ONCE DAILY.    ATORVASTATIN (LIPITOR) 40 MG TABLET    Take 40 mg by mouth every evening.    CLOPIDOGREL (PLAVIX) 75 MG TABLET    TAKE (1) TABLET BY MOUTH ONCE DAILY.    DULOXETINE (CYMBALTA) 30 MG CAPSULE    Take 30 mg by mouth every evening.    GLIMEPIRIDE (AMARYL) 1 MG TABLET    Take 1 mg by mouth once daily.    IBUPROFEN (ADVIL,MOTRIN) 600 MG TABLET    Take 600 mg by mouth 3 (three) times daily.    LOSARTAN (COZAAR) 50 MG TABLET    Take 50 mg by mouth once daily.    NITROGLYCERIN (NITROSTAT) 0.4 MG SL TABLET    Place 1 tablet (0.4 mg total) under the tongue every 5 (five) minutes as needed for Chest pain.    PIOGLITAZONE (ACTOS) 15 MG TABLET    Take 15 mg by mouth once daily.    SYNTHROID 88 MCG TABLET    Take 88 mcg by mouth once daily.    TRULICITY 0.75 MG/0.5 ML PEN INJECTOR    Inject into the skin.             Assessment:       1. Hx of CABG    2. CAD in native artery    3. Essential hypertension    4. Other secondary pulmonary hypertension    5. Type 2 diabetes mellitus with diabetic neuropathy, without long-term current use of insulin    6. Mixed hyperlipidemia    7. Nonspecific abnormal electrocardiogram (ECG) (EKG)    8. Acquired hypothyroidism    9. Mild left atrial enlargement    10. Moderate tricuspid regurgitation    11. Esophageal stricture         Plan:   1. Coronary artery disease status post CABG  Patient is doing well at the present time.  She is on Plavix 75 mg p.o.  q.day and patient also started taking aspirin.  Discussed with patient to stop aspirin and take half a tablet of Plavix in a.m.  2. Essential hypertension  Her blood pressure has been stable is 118/66 and she is currently on losartan 50 mg p.o. q.day and amlodipine 2.5 mg p.o. q.day continue the same she is also on atenolol 25 mg p.o. q.day.  we she  3. Diabetes mellitus  Patient was started on Trulicity it higher doses she could not tolerate and had significant side effects including diarrhea and nausea and vomiting.  She has been off of it for a week if she is going to be started on another smaller dose.  Discussed with patient she can only tolerate 0.7 mg.  Discussed with patient if Trulicity still gives her nausea and vomiting and diarrhea that she should completely stop it.  She was also going to be started on Actos discussed with patient to start it at a half a does and see how she tolerates it.  4. Mixed hyperlipidemia  Patient is currently on Lipitor 40 mg p.o. q.day continue the same and a primary physician is checking her cholesterol and liver function test.  5. Acquired hypothyroidism  Patient is currently on Synthroid 88 mcg p.o. q.day continue the same and her endocrinologist is checking her thyroid function test.  6. EKG  Reviewed EKG independently she is in normal sinus rhythm with heart rate of 62 beats per minute incomplete right bundle branch block no other acute changes.  7. Continue current management I will see her back in the office in 6 months.  8. Discussed with patient that whenever she feels jittery and nervous and anxious and sweaty that she needs to check her blood sugars for hypoglycemia before taking any Xanax or any other medications.            Problem List Items Addressed This Visit        Pulmonary    Other secondary pulmonary hypertension       Cardiac/Vascular    Hx of CABG - Primary    Relevant Orders    IN OFFICE EKG 12-LEAD (to [facility])    Mixed hyperlipidemia    Essential  hypertension    Nonspecific abnormal electrocardiogram (ECG) (EKG)    Mild left atrial enlargement    Moderate tricuspid regurgitation       Endocrine    Type 2 diabetes mellitus with diabetic neuropathy, without long-term current use of insulin    Relevant Medications    pioglitazone (ACTOS) 15 MG tablet    TRULICITY 0.75 mg/0.5 mL pen injector    Acquired hypothyroidism       GI    Esophageal stricture      Other Visit Diagnoses     CAD in native artery              No follow-ups on file.

## 2022-11-01 ENCOUNTER — TELEPHONE (OUTPATIENT)
Dept: CARDIOLOGY | Facility: CLINIC | Age: 77
End: 2022-11-01
Payer: MEDICARE

## 2022-11-01 NOTE — TELEPHONE ENCOUNTER
----- Message from Min Manning sent at 11/1/2022  2:54 PM CDT -----  Contact: Patient  The pt called and doesn't want you to forget about her appt before the end of the month (?)    Please call her back at [phone] or [phone]

## 2022-11-02 NOTE — TELEPHONE ENCOUNTER
----- Message from Gema Maradiaga sent at 11/2/2022 11:28 AM CDT -----  Name Of Caller:[place]            Provider Name:Slade Oconnor            Does patient feel the need to be seen today?No            Relationship to the Pt?:Pt            Contact Preference?:[phone]            What is the nature of the call?: Pt would like a call back regarding an appt

## 2022-12-09 ENCOUNTER — OFFICE VISIT (OUTPATIENT)
Dept: CARDIOLOGY | Facility: CLINIC | Age: 77
End: 2022-12-09
Payer: MEDICARE

## 2022-12-09 VITALS
HEART RATE: 61 BPM | WEIGHT: 154 LBS | OXYGEN SATURATION: 98 % | BODY MASS INDEX: 30.23 KG/M2 | HEIGHT: 60 IN | DIASTOLIC BLOOD PRESSURE: 56 MMHG | SYSTOLIC BLOOD PRESSURE: 120 MMHG

## 2022-12-09 DIAGNOSIS — I27.29 OTHER SECONDARY PULMONARY HYPERTENSION: ICD-10-CM

## 2022-12-09 DIAGNOSIS — E11.40 TYPE 2 DIABETES MELLITUS WITH DIABETIC NEUROPATHY, WITHOUT LONG-TERM CURRENT USE OF INSULIN: ICD-10-CM

## 2022-12-09 DIAGNOSIS — E03.9 ACQUIRED HYPOTHYROIDISM: ICD-10-CM

## 2022-12-09 DIAGNOSIS — I10 ESSENTIAL HYPERTENSION: Primary | ICD-10-CM

## 2022-12-09 DIAGNOSIS — R94.31 NONSPECIFIC ABNORMAL ELECTROCARDIOGRAM (ECG) (EKG): ICD-10-CM

## 2022-12-09 DIAGNOSIS — I51.7 MILD LEFT ATRIAL ENLARGEMENT: ICD-10-CM

## 2022-12-09 DIAGNOSIS — E78.2 MIXED HYPERLIPIDEMIA: ICD-10-CM

## 2022-12-09 DIAGNOSIS — I07.1 MODERATE TRICUSPID REGURGITATION: ICD-10-CM

## 2022-12-09 DIAGNOSIS — I51.7 RIGHT ATRIAL ENLARGEMENT: ICD-10-CM

## 2022-12-09 DIAGNOSIS — I25.10 CAD IN NATIVE ARTERY: ICD-10-CM

## 2022-12-09 DIAGNOSIS — Z95.1 HX OF CABG: ICD-10-CM

## 2022-12-09 DIAGNOSIS — K22.2 ESOPHAGEAL STRICTURE: ICD-10-CM

## 2022-12-09 PROCEDURE — 99215 OFFICE O/P EST HI 40 MIN: CPT | Mod: S$GLB,,, | Performed by: INTERNAL MEDICINE

## 2022-12-09 PROCEDURE — 93010 EKG 12-LEAD: ICD-10-PCS | Mod: S$GLB,,, | Performed by: INTERNAL MEDICINE

## 2022-12-09 PROCEDURE — 99215 PR OFFICE/OUTPT VISIT, EST, LEVL V, 40-54 MIN: ICD-10-PCS | Mod: S$GLB,,, | Performed by: INTERNAL MEDICINE

## 2022-12-09 PROCEDURE — 93010 ELECTROCARDIOGRAM REPORT: CPT | Mod: S$GLB,,, | Performed by: INTERNAL MEDICINE

## 2022-12-09 RX ORDER — ATENOLOL 25 MG/1
25 TABLET ORAL DAILY
Qty: 90 TABLET | Refills: 3 | Status: SHIPPED | OUTPATIENT
Start: 2022-12-09 | End: 2023-03-06 | Stop reason: SDUPTHER

## 2022-12-09 RX ORDER — OMEPRAZOLE 10 MG/1
10 CAPSULE, DELAYED RELEASE ORAL DAILY
COMMUNITY

## 2022-12-09 RX ORDER — AMLODIPINE BESYLATE 2.5 MG/1
2.5 TABLET ORAL 2 TIMES DAILY
Qty: 180 TABLET | Refills: 3 | Status: SHIPPED | OUTPATIENT
Start: 2022-12-09 | End: 2023-03-06 | Stop reason: SDUPTHER

## 2022-12-09 RX ORDER — CLOPIDOGREL BISULFATE 75 MG/1
TABLET ORAL
Qty: 90 TABLET | Refills: 3 | Status: CANCELLED | OUTPATIENT
Start: 2022-12-09

## 2022-12-09 RX ORDER — CLOPIDOGREL BISULFATE 75 MG/1
75 TABLET ORAL DAILY
Qty: 90 TABLET | Refills: 3 | Status: SHIPPED | OUTPATIENT
Start: 2022-12-09 | End: 2023-06-30 | Stop reason: SDUPTHER

## 2022-12-09 RX ORDER — BIOTIN 5000 MCG
TABLET,DISINTEGRATING ORAL
COMMUNITY

## 2022-12-09 RX ORDER — SITAGLIPTIN AND METFORMIN HYDROCHLORIDE 500; 50 MG/1; MG/1
1 TABLET, FILM COATED ORAL 2 TIMES DAILY
COMMUNITY
Start: 2022-11-23

## 2022-12-09 RX ORDER — CYANOCOBALAMIN (VITAMIN B-12) 1000MCG/15
LIQUID (ML) ORAL
COMMUNITY

## 2022-12-09 RX ORDER — AMLODIPINE BESYLATE 2.5 MG/1
TABLET ORAL
Qty: 180 TABLET | Refills: 3 | Status: CANCELLED | OUTPATIENT
Start: 2022-12-09

## 2022-12-09 RX ORDER — ATENOLOL 25 MG/1
TABLET ORAL
Qty: 90 TABLET | Refills: 3 | Status: CANCELLED | OUTPATIENT
Start: 2022-12-09

## 2022-12-09 NOTE — PROGRESS NOTES
Subjective:    Patient ID:  Maylin Birmingham is a 77 y.o. female     Chief Complaint   Patient presents with    Follow-up       HPI:  Ms Maylin Birmingham is a 77 y.o. female here for follow-up.    Patient has been doing well no specific complaints at the present time.  The major complaint patient has is that she has significant amount of insomnia.    Denies any chest pain or tightness heaviness denies any shortness of breath or difficulty in breathing denies any dizziness or lightheadedness or loss of consciousness.    She is been taking all her medications regularly.        Review of patient's allergies indicates:  No Known Allergies    Past Medical History:   Diagnosis Date    Bradycardia     CAD (coronary artery disease)     Diabetes     HTN (hypertension)      Past Surgical History:   Procedure Laterality Date    CATARACT EXTRACTION Bilateral     CORONARY ARTERY BYPASS GRAFT      HYSTERECTOMY      INSERT / REPLACE / REMOVE PACEMAKER       Social History     Tobacco Use    Smoking status: Never    Smokeless tobacco: Never   Substance Use Topics    Alcohol use: Never    Drug use: Never     Family History   Problem Relation Age of Onset    Breast cancer Maternal Aunt     Breast cancer Sister     Breast cancer Sister     Diabetes Mother         Review of Systems:   Constitution: Negative for diaphoresis and fever.   HEENT: Negative for nosebleeds.    Cardiovascular: Negative for chest pain       No dyspnea on exertion       No leg swelling        No palpitations  Respiratory: Negative for shortness of breath and wheezing.    Hematologic/Lymphatic: Negative for bleeding problem. Does not bruise/bleed easily.   Skin: Negative for color change and rash.   Musculoskeletal: Negative for falls and myalgias.   Gastrointestinal: Negative for hematemesis and hematochezia.   Genitourinary: Negative for hematuria.   Neurological: Negative for dizziness and light-headedness.   Psychiatric/Behavioral: Negative for altered mental  status and memory loss.          Objective:        Vitals:    12/09/22 0852   BP: (!) 120/56   Pulse: 61       No results found for: WBC, HGB, HCT, PLT, CHOL, TRIG, HDL, LDLDIRECT, ALT, AST, NA, K, CL, CREATININE, BUN, CO2, TSH, PSA, INR, GLUF, HGBA1C, MICROALBUR     ECHOCARDIOGRAM RESULTS  Results for orders placed during the hospital encounter of 05/20/21    Echo Color Flow Doppler? Yes    Interpretation Summary  · The left ventricle is normal in size with normal systolic function.  · Normal left ventricular diastolic function.  · The estimated ejection fraction is 70%.  · Normal right ventricular size with normal right ventricular systolic function.  · Moderate to severe tricuspid regurgitation.  · Mild mitral regurgitation.  · There is mild pulmonary hypertension.        CURRENT/PREVIOUS VISIT EKG  Results for orders placed or performed in visit on 07/22/22   IN OFFICE EKG 12-LEAD (to [place])    Collection Time: 07/22/22 11:25 AM    Narrative    Test Reason : Z95.1,    Vent. Rate : 062 BPM     Atrial Rate : 062 BPM     P-R Int : 194 ms          QRS Dur : 110 ms      QT Int : 422 ms       P-R-T Axes : 082 074 076 degrees     QTc Int : 428 ms    Normal sinus rhythm  Incomplete right bundle branch block  Borderline Abnormal ECG  When compared with ECG of 21-JAN-2022 08:50,  No significant change was found  Confirmed by Slade Oconnor MD ([id number]) on 8/2/2022 7:33:59 PM    Referred By:  CB           Confirmed By:Slade Oconnor MD     No valid procedures specified.   No results found for this or any previous visit.      Physical Exam:  CONSTITUTIONAL: No fever, no chills  HEENT: Normocephalic, atraumatic,pupils reactive to light                 NECK:  No JVD no carotid bruit  CVS: S1S2+, RRR, systolic murmurs,   LUNGS: Clear  ABDOMEN: Soft, NT, BS+  EXTREMITIES: No cyanosis, edema  : No olivia catheter  NEURO: AAO X 3  PSY: Normal affect      Medication List with Changes/Refills   Current Medications    ALPRAZOLAM (XANAX)  1 MG TABLET    Take 1 mg by mouth once daily.    AMLODIPINE (NORVASC) 2.5 MG TABLET    TAKE (1) TABLET BY MOUTH TWICE A DAY.    ATENOLOL (TENORMIN) 25 MG TABLET    TAKE (1) TABLET BY MOUTH ONCE DAILY.    ATORVASTATIN (LIPITOR) 40 MG TABLET    Take 40 mg by mouth every evening.    CLOPIDOGREL (PLAVIX) 75 MG TABLET    TAKE (1) TABLET BY MOUTH ONCE DAILY.    CYANOCOBALAMIN, VITAMIN B-12, 1,000 MCG/15 ML LIQD    Take by mouth.    DULOXETINE (CYMBALTA) 30 MG CAPSULE    Take 30 mg by mouth every evening.    GLIMEPIRIDE (AMARYL) 1 MG TABLET    Take 1 mg by mouth once daily.    IBUPROFEN (ADVIL,MOTRIN) 600 MG TABLET    Take 600 mg by mouth 3 (three) times daily.    JANUMET  MG PER TABLET    Take 1 tablet by mouth 2 (two) times daily.    LOSARTAN (COZAAR) 50 MG TABLET    Take 50 mg by mouth once daily.    MELATONIN 1 MG TBDL    Take by mouth.    NITROGLYCERIN (NITROSTAT) 0.4 MG SL TABLET    Place 1 tablet (0.4 mg total) under the tongue every 5 (five) minutes as needed for Chest pain.    OMEPRAZOLE (PRILOSEC) 10 MG CAPSULE    Take 10 mg by mouth once daily.    PIOGLITAZONE (ACTOS) 15 MG TABLET    Take 15 mg by mouth once daily.    SYNTHROID 88 MCG TABLET    Take 88 mcg by mouth once daily.   Discontinued Medications    TRULICITY 0.75 MG/0.5 ML PEN INJECTOR    Inject into the skin.             Assessment:       1. Essential hypertension    2. CAD in native artery    3. Hx of CABG    4. Other secondary pulmonary hypertension    5. Mixed hyperlipidemia    6. Type 2 diabetes mellitus with diabetic neuropathy, without long-term current use of insulin    7. Acquired hypothyroidism    8. Moderate tricuspid regurgitation    9. Esophageal stricture    10. Mild left atrial enlargement    11. Nonspecific abnormal electrocardiogram (ECG) (EKG)    12. Right atrial enlargement         Plan:   1. Essential hypertension  Patient's blood pressure is 120/56.  And she is currently on losartan 50 mg p.o. daily atenolol 25 mg p.o. q.day  amlodipine 2.5 mg p.o. b.i.d..  Continue the same.    2. Refilled her prescriptions for amlodipine 2.5 mg p.o. b.i.d. and atenolol 25 mg p.o. daily and Plavix 75 mg.    3. Coronary artery disease status post CABG   Patient is stable at the present time.  Denies any exertional angina or shortness of breath.    No acute ST T-wave changes or EKG changes.    4. Mixed hyperlipidemia   Patient is currently on atorvastatin 40 mg p.o. q.h.s. continue the same and a primary physician is checking her cholesterol and liver function test.  5. Hypothyroidism   Patient is currently on Synthroid 88 mcg p.o. daily continue the same and a primary physician is checking her thyroid function test.  6. Diabetes mellitus  Patient is currently on Actos 15 mg p.o. daily continue the same she is also on Janumet continue the same.  She also takes Amaryl 1 mg p.o. daily and Janumet  mg p.o. b.i.d..  And a primary physician is checking her blood sugars and HbA1c.  And readjusting the doses.  7. EKG  Reviewed EKG independently patient is in normal sinus rhythm with an incomplete right bundle branch block no acute ST T-wave changes no significant change from the previous EKG.    8. Patient to continue current management she had an echocardiogram done in May of 2022.  And she has normal LV function and moderate to severe tricuspid regurgitation and mild left atrial enlargement.  She also has right atrial enlargement.  9. Patient to continue current management I will see her back in the office in 6 months time.              Problem List Items Addressed This Visit          Pulmonary    Other secondary pulmonary hypertension       Cardiac/Vascular    Hx of CABG    Mixed hyperlipidemia    Essential hypertension - Primary    Relevant Orders    IN OFFICE EKG 12-LEAD (to [place])    Nonspecific abnormal electrocardiogram (ECG) (EKG)    Mild left atrial enlargement    Moderate tricuspid regurgitation       Endocrine    Type 2 diabetes mellitus with  diabetic neuropathy, without long-term current use of insulin    Relevant Medications    JANUMET  mg per tablet    Acquired hypothyroidism       GI    Esophageal stricture     Other Visit Diagnoses       CAD in native artery        Right atrial enlargement                No follow-ups on file.

## 2022-12-20 DIAGNOSIS — Z12.31 ENCOUNTER FOR SCREENING MAMMOGRAM FOR BREAST CANCER: Primary | ICD-10-CM

## 2022-12-28 ENCOUNTER — OFFICE VISIT (OUTPATIENT)
Dept: OBSTETRICS AND GYNECOLOGY | Facility: CLINIC | Age: 77
End: 2022-12-28
Payer: MEDICARE

## 2022-12-28 ENCOUNTER — HOSPITAL ENCOUNTER (OUTPATIENT)
Dept: RADIOLOGY | Facility: CLINIC | Age: 77
Discharge: HOME | End: 2022-12-28
Payer: MEDICARE

## 2022-12-28 VITALS
DIASTOLIC BLOOD PRESSURE: 62 MMHG | WEIGHT: 155 LBS | SYSTOLIC BLOOD PRESSURE: 128 MMHG | BODY MASS INDEX: 25.83 KG/M2 | HEIGHT: 65 IN

## 2022-12-28 DIAGNOSIS — Z12.31 ENCOUNTER FOR SCREENING MAMMOGRAM FOR BREAST CANCER: ICD-10-CM

## 2022-12-28 DIAGNOSIS — Z01.419 ENCOUNTER FOR WELL WOMAN EXAM WITH ROUTINE GYNECOLOGICAL EXAM: Primary | ICD-10-CM

## 2022-12-28 DIAGNOSIS — Z12.31 ENCOUNTER FOR SCREENING MAMMOGRAM FOR MALIGNANT NEOPLASM OF BREAST: ICD-10-CM

## 2022-12-28 PROCEDURE — 77067 MAMMO DIGITAL SCREENING BILAT WITH TOMO: ICD-10-PCS | Mod: S$GLB,,, | Performed by: RADIOLOGY

## 2022-12-28 PROCEDURE — G0101 CA SCREEN;PELVIC/BREAST EXAM: HCPCS | Mod: S$GLB,,, | Performed by: OBSTETRICS & GYNECOLOGY

## 2022-12-28 PROCEDURE — 77063 MAMMO DIGITAL SCREENING BILAT WITH TOMO: ICD-10-PCS | Mod: S$GLB,,, | Performed by: RADIOLOGY

## 2022-12-28 PROCEDURE — 77063 BREAST TOMOSYNTHESIS BI: CPT | Mod: S$GLB,,, | Performed by: RADIOLOGY

## 2022-12-28 PROCEDURE — G0101 PR CA SCREEN;PELVIC/BREAST EXAM: ICD-10-PCS | Mod: S$GLB,,, | Performed by: OBSTETRICS & GYNECOLOGY

## 2022-12-28 PROCEDURE — 77067 SCR MAMMO BI INCL CAD: CPT | Mod: S$GLB,,, | Performed by: RADIOLOGY

## 2022-12-28 RX ORDER — CALCIUM CITRATE/VITAMIN D3 200MG-6.25
TABLET ORAL
COMMUNITY
Start: 2022-12-24

## 2022-12-28 RX ORDER — GLUCOSAM/CHON-MSM1/C/MANG/BOSW 500-416.6
TABLET ORAL 3 TIMES DAILY
COMMUNITY
Start: 2022-12-24

## 2022-12-28 RX ORDER — ATORVASTATIN CALCIUM 80 MG/1
80 TABLET, FILM COATED ORAL
COMMUNITY
Start: 2022-10-28

## 2022-12-28 NOTE — PROGRESS NOTES
"CC: Well woman exam    Maylin Birmingham is a 77 y.o. female  presents for well woman exam.  LMP: No LMP recorded. Patient has had a hysterectomy..   Patients last pap was unknown.  Last wellness labs were done .  Last mammogram was .  Last colonoscopy was .  Primary care is [facility].  SBE yes  No issues, problems, or complaints.  Patient returns today for wellness exam and mammogram and ovary exam.  Denies any new problems.  Had a hysterectomy in the past.    Chief Complaint   Patient presents with    Well Woman     Patient is here for annual exam.        Past Medical History:   Diagnosis Date    Bradycardia     CAD (coronary artery disease)     Diabetes     HTN (hypertension)     Hyperlipidemia     Thyroid disease      Past Surgical History:   Procedure Laterality Date    CATARACT EXTRACTION Bilateral     CORONARY ARTERY BYPASS GRAFT      HYSTERECTOMY      INSERT / REPLACE / REMOVE PACEMAKER       Social History     Socioeconomic History    Marital status:    Tobacco Use    Smoking status: Never    Smokeless tobacco: Never   Substance and Sexual Activity    Alcohol use: Never    Drug use: Never    Sexual activity: Yes     Family History   Problem Relation Age of Onset    Diabetes Mother     Cancer Brother     Breast cancer Sister     Breast cancer Sister     Breast cancer Maternal Aunt      OB History          3    Para   3    Term   3            AB        Living             SAB        IAB        Ectopic        Multiple        Live Births                     /62   Ht 5' 5" (1.651 m)   Wt 70.3 kg (155 lb)   BMI 25.79 kg/m²       ROS:  GENERAL: Denies weight gain or weight loss. Feeling well overall.   SKIN: Denies rash or lesions.   HEAD: Denies head injury or headache.   NODES: Denies enlarged lymph nodes.   CHEST: Denies chest pain or shortness of breath.   CARDIOVASCULAR: Denies palpitations or left sided chest pain.   ABDOMEN: No abdominal pain, constipation, " diarrhea, nausea, vomiting or rectal bleeding.   URINARY: No frequency, dysuria, hematuria, or burning on urination.  REPRODUCTIVE: See HPI.   BREASTS: The patient performs breast self-examination and denies pain, lumps, or nipple discharge.   HEMATOLOGIC: No easy bruisability or excessive bleeding.   MUSCULOSKELETAL: Denies joint pain or swelling.   NEUROLOGIC: Denies syncope or weakness.   PSYCHIATRIC: Denies depression, anxiety or mood swings.    PHYSICAL EXAM:  APPEARANCE: Well nourished, well developed, in no acute distress.  AFFECT: WNL, alert and oriented x 3  SKIN: No acne or hirsutism  NECK: Neck symmetric without masses or thyromegaly  NODES: No inguinal, cervical, or axillary lymph node enlargement  CHEST: Good respiratory effect  ABDOMEN: Soft.  No tenderness or masses.  No hepatosplenomegaly.  No hernias.  BREASTS: Symmetrical, no skin changes or visible lesions.  No palpable masses, nipple discharge bilaterally.  PELVIC: Normal external genitalia without lesions. Normal urethral meatus.  Vagina without lesions or discharge.  Cervix without lesions, discharge or tenderness.  No significant cystocele or rectocele.  Bimanual exam shows uterus to be normal size, regular, mobile and nontender.  Adnexa without masses or tenderness.    EXTREMITIES: No edema.    Encounter for well woman exam with routine gynecological exam    Encounter for screening mammogram for malignant neoplasm of breast            Patient was counseled today on A.C.S. Pap guidelines and recommendations for yearly pelvic exams, mammograms and monthly self breast exams; to see her PCP for other health maintenance.     Follow up in about 1 year (around 12/28/2023) for Wellness and mammogram and ovary exam and breast exam.

## 2023-01-05 ENCOUNTER — TELEPHONE (OUTPATIENT)
Dept: CARDIOLOGY | Facility: CLINIC | Age: 78
End: 2023-01-05
Payer: MEDICARE

## 2023-01-05 NOTE — TELEPHONE ENCOUNTER
----- Message from Salima Monae sent at 1/5/2023  4:01 PM CST -----  Contact: pt  Patient is asking for a call back she wants to know when will he be back in the [place] office   She wants to be treated for high blood pressure      Please call pt back [phone]

## 2023-01-13 ENCOUNTER — OFFICE VISIT (OUTPATIENT)
Dept: CARDIOLOGY | Facility: CLINIC | Age: 78
End: 2023-01-13
Payer: MEDICARE

## 2023-01-13 VITALS
BODY MASS INDEX: 25.66 KG/M2 | HEART RATE: 67 BPM | WEIGHT: 154 LBS | SYSTOLIC BLOOD PRESSURE: 138 MMHG | HEIGHT: 65 IN | DIASTOLIC BLOOD PRESSURE: 84 MMHG

## 2023-01-13 DIAGNOSIS — I27.29 OTHER SECONDARY PULMONARY HYPERTENSION: ICD-10-CM

## 2023-01-13 DIAGNOSIS — I07.1 MODERATE TRICUSPID REGURGITATION: ICD-10-CM

## 2023-01-13 DIAGNOSIS — R94.31 NONSPECIFIC ABNORMAL ELECTROCARDIOGRAM (ECG) (EKG): ICD-10-CM

## 2023-01-13 DIAGNOSIS — I51.7 MILD LEFT ATRIAL ENLARGEMENT: ICD-10-CM

## 2023-01-13 DIAGNOSIS — I25.10 CAD IN NATIVE ARTERY: Primary | ICD-10-CM

## 2023-01-13 DIAGNOSIS — I10 ESSENTIAL HYPERTENSION: ICD-10-CM

## 2023-01-13 DIAGNOSIS — E78.2 MIXED HYPERLIPIDEMIA: ICD-10-CM

## 2023-01-13 DIAGNOSIS — F41.9 ANXIETY DISORDER, UNSPECIFIED TYPE: ICD-10-CM

## 2023-01-13 DIAGNOSIS — E03.9 ACQUIRED HYPOTHYROIDISM: ICD-10-CM

## 2023-01-13 DIAGNOSIS — E11.40 TYPE 2 DIABETES MELLITUS WITH DIABETIC NEUROPATHY, WITHOUT LONG-TERM CURRENT USE OF INSULIN: ICD-10-CM

## 2023-01-13 DIAGNOSIS — K22.2 ESOPHAGEAL STRICTURE: ICD-10-CM

## 2023-01-13 DIAGNOSIS — Z95.1 HX OF CABG: ICD-10-CM

## 2023-01-13 PROCEDURE — 99214 PR OFFICE/OUTPT VISIT, EST, LEVL IV, 30-39 MIN: ICD-10-PCS | Mod: S$GLB,,, | Performed by: INTERNAL MEDICINE

## 2023-01-13 PROCEDURE — 93000 ELECTROCARDIOGRAM COMPLETE: CPT | Mod: S$GLB,,, | Performed by: INTERNAL MEDICINE

## 2023-01-13 PROCEDURE — 93000 EKG 12-LEAD: ICD-10-PCS | Mod: S$GLB,,, | Performed by: INTERNAL MEDICINE

## 2023-01-13 PROCEDURE — 99214 OFFICE O/P EST MOD 30 MIN: CPT | Mod: S$GLB,,, | Performed by: INTERNAL MEDICINE

## 2023-01-13 NOTE — PROGRESS NOTES
Subjective:    Patient ID:  Maylin Birmingham is a 77 y.o. female     Chief Complaint   Patient presents with    Hyperlipidemia    Hypertension       HPI:  Ms Maylin Birmingham is a 77 y.o. female is here for follow-up.    Patient has been doing well no specific complaints at the present time.  Her breathing is good denies any shortness of breath or difficulty in breathing denies any chest pain or tightness or heaviness denies any dizziness or lightheadedness or loss of consciousness.        Review of patient's allergies indicates:  No Known Allergies    Past Medical History:   Diagnosis Date    Bradycardia     CAD (coronary artery disease)     Diabetes     HTN (hypertension)     Hyperlipidemia     Thyroid disease      Past Surgical History:   Procedure Laterality Date    CATARACT EXTRACTION Bilateral     CORONARY ARTERY BYPASS GRAFT      HYSTERECTOMY      INSERT / REPLACE / REMOVE PACEMAKER       Social History     Tobacco Use    Smoking status: Never    Smokeless tobacco: Never   Substance Use Topics    Alcohol use: Never    Drug use: Never     Family History   Problem Relation Age of Onset    Diabetes Mother     Cancer Brother     Breast cancer Sister     Breast cancer Sister     Breast cancer Maternal Aunt         Review of Systems:   Constitution: Negative for diaphoresis and fever.   HEENT: Negative for nosebleeds.    Cardiovascular: Negative for chest pain       No dyspnea on exertion       No leg swelling        No palpitations  Respiratory: Negative for shortness of breath and wheezing.    Hematologic/Lymphatic: Negative for bleeding problem. Does not bruise/bleed easily.   Skin: Negative for color change and rash.   Musculoskeletal: Negative for falls and myalgias.   Gastrointestinal: Negative for hematemesis and hematochezia.   Genitourinary: Negative for hematuria.   Neurological: Negative for dizziness and light-headedness.   Psychiatric/Behavioral: Negative for altered mental status and memory loss.   Intermittent anxiety.         Objective:        Vitals:    01/13/23 0923   BP: 138/84   Pulse: 67       No results found for: WBC, HGB, HCT, PLT, CHOL, TRIG, HDL, LDLDIRECT, ALT, AST, NA, K, CL, CREATININE, BUN, CO2, TSH, PSA, INR, GLUF, HGBA1C, MICROALBUR     ECHOCARDIOGRAM RESULTS  Results for orders placed during the hospital encounter of 05/20/21    Echo Color Flow Doppler? Yes    Interpretation Summary  · The left ventricle is normal in size with normal systolic function.  · Normal left ventricular diastolic function.  · The estimated ejection fraction is 70%.  · Normal right ventricular size with normal right ventricular systolic function.  · Moderate to severe tricuspid regurgitation.  · Mild mitral regurgitation.  · There is mild pulmonary hypertension.        CURRENT/PREVIOUS VISIT EKG  Results for orders placed or performed in visit on 12/09/22   IN OFFICE EKG 12-LEAD (to [place])    Collection Time: 12/09/22  9:03 AM    Narrative    Test Reason : I10,    Vent. Rate : 061 BPM     Atrial Rate : 061 BPM     P-R Int : 180 ms          QRS Dur : 110 ms      QT Int : 450 ms       P-R-T Axes : 066 073 069 degrees     QTc Int : 453 ms    Normal sinus rhythm  Incomplete right bundle branch block  Borderline Abnormal ECG  When compared with ECG of 22-JUL-2022 11:25,  No significant change was found  Confirmed by Slade Oconnor MD ([id number]) on 12/20/2022 5:03:46 PM    Referred By:  CB           Confirmed By:Slade Oconnor MD     No valid procedures specified.   No results found for this or any previous visit.      Physical Exam:  CONSTITUTIONAL: No fever, no chills  HEENT: Normocephalic, atraumatic,pupils reactive to light                 NECK:  No JVD no carotid bruit  CVS: S1S2+, RRR, systolic murmurs,   LUNGS: Clear  ABDOMEN: Soft, NT, BS+  EXTREMITIES: No cyanosis, edema  : No olivia catheter  NEURO: AAO X 3  PSY: Normal affect      Medication List with Changes/Refills   Current Medications    ALPRAZOLAM (XANAX) 1 MG  TABLET    Take 1 mg by mouth once daily.    AMLODIPINE (NORVASC) 2.5 MG TABLET    TAKE (1) TABLET BY MOUTH TWICE A DAY.    AMLODIPINE (NORVASC) 2.5 MG TABLET    Take 1 tablet (2.5 mg total) by mouth 2 (two) times daily.    ATENOLOL (TENORMIN) 25 MG TABLET    TAKE (1) TABLET BY MOUTH ONCE DAILY.    ATENOLOL (TENORMIN) 25 MG TABLET    Take 1 tablet (25 mg total) by mouth once daily.    ATORVASTATIN (LIPITOR) 40 MG TABLET    Take 40 mg by mouth every evening.    ATORVASTATIN (LIPITOR) 80 MG TABLET    Take 80 mg by mouth.    CLOPIDOGREL (PLAVIX) 75 MG TABLET    TAKE (1) TABLET BY MOUTH ONCE DAILY.    CLOPIDOGREL (PLAVIX) 75 MG TABLET    Take 1 tablet (75 mg total) by mouth once daily.    CYANOCOBALAMIN, VITAMIN B-12, 1,000 MCG/15 ML LIQD    Take by mouth.    DULOXETINE (CYMBALTA) 30 MG CAPSULE    Take 30 mg by mouth every evening.    GLIMEPIRIDE (AMARYL) 1 MG TABLET    Take 1 mg by mouth once daily.    IBUPROFEN (ADVIL,MOTRIN) 600 MG TABLET    Take 600 mg by mouth 3 (three) times daily.    JANUMET  MG PER TABLET    Take 1 tablet by mouth 2 (two) times daily.    LOSARTAN (COZAAR) 50 MG TABLET    Take 50 mg by mouth once daily.    MELATONIN 1 MG TBDL    Take by mouth.    NITROGLYCERIN (NITROSTAT) 0.4 MG SL TABLET    Place 1 tablet (0.4 mg total) under the tongue every 5 (five) minutes as needed for Chest pain.    OMEPRAZOLE (PRILOSEC) 10 MG CAPSULE    Take 10 mg by mouth once daily.    PIOGLITAZONE (ACTOS) 15 MG TABLET    Take 15 mg by mouth once daily.    SYNTHROID 88 MCG TABLET    Take 88 mcg by mouth once daily.    TRUE METRIX GLUCOSE TEST STRIP STRP    SMARTSIG:Via Meter    TRUEPLUS LANCETS 28 GAUGE MISC    Apply topically 3 (three) times daily.             Assessment:       1. CAD in native artery    2. Hx of CABG    3. Essential hypertension    4. Mixed hyperlipidemia    5. Other secondary pulmonary hypertension    6. Type 2 diabetes mellitus with diabetic neuropathy, without long-term current use of insulin     7. Acquired hypothyroidism    8. Mild left atrial enlargement    9. Moderate tricuspid regurgitation    10. Esophageal stricture    11. Nonspecific abnormal electrocardiogram (ECG) (EKG)         Plan:   1. CAD status post CABG   Patient is doing well at the present time.  She is currently on Plavix 75 mg p.o. daily continue the same no bleeding issues of blood in the stool urine.  2. Essential hypertension   Patient's blood pressure is stable at 130 8/84 and she is currently on losartan 50 mg p.o. daily continue the same she is on atenolol 25 mg p.o. daily and amlodipine 2.5 mg p.o. b.i.d..  3. Diabetes   She is currently on Janumet  mg 1 tablet twice daily.  Continue the same.  She is also on Actos 15 mg p.o. daily.  She is also on glimepiride 1 mg p.o. daily.    4. Acquired hypothyroidism   She is currently on Synthroid 88 mcg p.o. daily continue the same and a primary physician is checking her thyroid function test.    5. Left atrial enlargement   On her previous echo she had mild left atrial enlargement it has been stable at the present time.    6. EKG  Reviewed EKG independently patient is in normal sinus rhythm with a heart rate of 67 beats per minute incomplete right bundle branch block.  No acute ST T-wave changes.    7. Patient to continue current management and I will see her back in the office in 6 months.          Problem List Items Addressed This Visit          Pulmonary    Other secondary pulmonary hypertension       Cardiac/Vascular    Hx of CABG    Mixed hyperlipidemia    Essential hypertension    Nonspecific abnormal electrocardiogram (ECG) (EKG)    Mild left atrial enlargement    Moderate tricuspid regurgitation       Endocrine    Type 2 diabetes mellitus with diabetic neuropathy, without long-term current use of insulin    Acquired hypothyroidism       GI    Esophageal stricture     Other Visit Diagnoses       CAD in native artery    -  Primary            No follow-ups on file.

## 2023-03-03 ENCOUNTER — TELEPHONE (OUTPATIENT)
Dept: CARDIOLOGY | Facility: CLINIC | Age: 78
End: 2023-03-03
Payer: MEDICARE

## 2023-03-03 NOTE — LETTER
March 3, 2023    Maylin Birmingham  [address] [facility] [place] [address]             [facility]  [address]  [place] [address]  Phone: [phone]  Fax: [phone] Patient: Maylin Birmingham  : 1945    Referring Doctor:Dr. SHEILA Owens   Procedure: Colonoscopy  Date of Procedure:3/30/2023  Type of Anesthesia:    Date of Last Stent:2023  Date of Last Office Visit:    Current Outpatient Medications   Medication Sig    ALPRAZolam (XANAX) 1 MG tablet Take 1 mg by mouth once daily.    amLODIPine (NORVASC) 2.5 MG tablet TAKE (1) TABLET BY MOUTH TWICE A DAY.    amLODIPine (NORVASC) 2.5 MG tablet Take 1 tablet (2.5 mg total) by mouth 2 (two) times daily.    atenoloL (TENORMIN) 25 MG tablet TAKE (1) TABLET BY MOUTH ONCE DAILY.    atenoloL (TENORMIN) 25 MG tablet Take 1 tablet (25 mg total) by mouth once daily.    atorvastatin (LIPITOR) 40 MG tablet Take 40 mg by mouth every evening.    atorvastatin (LIPITOR) 80 MG tablet Take 80 mg by mouth.    clopidogreL (PLAVIX) 75 mg tablet TAKE (1) TABLET BY MOUTH ONCE DAILY. (Patient taking differently: TAKE (1/2) TABLET BY MOUTH ONCE DAILY.)    clopidogreL (PLAVIX) 75 mg tablet Take 1 tablet (75 mg total) by mouth once daily.    cyanocobalamin, vitamin B-12, 1,000 mcg/15 mL Liqd Take by mouth.    DULoxetine (CYMBALTA) 30 MG capsule Take 30 mg by mouth every evening.    glimepiride (AMARYL) 1 MG tablet Take 1 mg by mouth once daily.    ibuprofen (ADVIL,MOTRIN) 600 MG tablet Take 600 mg by mouth 3 (three) times daily.    JANUMET  mg per tablet Take 1 tablet by mouth 2 (two) times daily.    losartan (COZAAR) 50 MG tablet Take 50 mg by mouth once daily.    melatonin 1 mg TbDL Take by mouth.    nitroGLYCERIN (NITROSTAT) 0.4 MG SL tablet Place 1 tablet (0.4 mg total) under the tongue every 5 (five) minutes as needed for Chest pain.    omeprazole (PRILOSEC) 10 MG  capsule Take 10 mg by mouth once daily.    pioglitazone (ACTOS) 15 MG tablet Take 15 mg by mouth once daily.    SYNTHROID 88 mcg tablet Take 88 mcg by mouth once daily.    TRUE METRIX GLUCOSE TEST STRIP Strp SMARTSIG:Via Meter    TRUEPLUS LANCETS 28 gauge Misc Apply topically 3 (three) times daily.     No current facility-administered medications for this visit.       This patient has been assessed for risk factors for clearance of surgery with the following stipulations:    ___ No contraindications    X    Recommendations for antiplatelet/anticoagulant medications:Patient may hold  Plavix for 7 days prior to surgery.      X    Cleared for surgery with the following contraindications/precautions: moderate risks.     ___ Not cleared for surgery due to the following reasons:    If you have any questions regarding the above, please contact my office at [phone].    Sincerely,      Tila Bradley NP-C  Department of Cardiology   [facility] [place], [place]

## 2023-03-03 NOTE — TELEPHONE ENCOUNTER
Patient to have Colonoscopy with Dr. SHEILA Owens on 3/30/2023.  Patient will need to hold Plavix prior to procedure. Please send letter with recommendations.

## 2023-03-06 RX ORDER — AMLODIPINE BESYLATE 2.5 MG/1
2.5 TABLET ORAL 2 TIMES DAILY
Qty: 180 TABLET | Refills: 3 | Status: SHIPPED | OUTPATIENT
Start: 2023-03-06 | End: 2023-06-30 | Stop reason: SDUPTHER

## 2023-03-06 RX ORDER — ATENOLOL 25 MG/1
25 TABLET ORAL DAILY
Qty: 90 TABLET | Refills: 3 | Status: SHIPPED | OUTPATIENT
Start: 2023-03-06 | End: 2023-06-30 | Stop reason: SDUPTHER

## 2023-03-06 NOTE — TELEPHONE ENCOUNTER
----- Message from Aziza Black sent at 3/6/2023  3:05 PM CST -----  Contact: pt  Type:  RX Refill Request    Who Called:  pt  Refill or New Rx:  refill-   pharm telling her she does not have refills, please call or resend      RX Name and Strength:     1.  atenoloL (TENORMIN) 25 MG tablet 90 tablet 3 12/9/2022 12/9/2023 No  Sig - Route: Take 1 tablet (25 mg total) by mouth once daily. - Oral        2. amLODIPine (NORVASC) 2.5 MG tablet 180 tablet 3 12/9/2022 12/9/2023 No  Sig - Route: Take 1 tablet (2.5 mg total) by mouth 2 (two) times daily. - Oral                  How is the patient currently taking it? (ex. 1XDay):  as order  Is this a 30 day or 90 day RX:  as order  Preferred Pharmacy with phone number:    [facility], [place] - [address]  [address] [address]  Phone: [phone] Fax: [phone]      Local or Mail Order:  local  Ordering Provider:  kalee Mora Call Back Number:  [phone]    Additional Information:

## 2023-03-06 NOTE — TELEPHONE ENCOUNTER
----- Message from Aziza Wayne sent at 3/6/2023  3:20 PM CST -----  Contact: pt  Type: Needs Medical Advice    Who Called: pt  Best Call Back Number: [phone]  Additional Information: Requesting a call back regarding: DR. K SHERREN-Primary    This dr wants to know if losartan will make her potassium high. Because it was high.       Please call pt        Please Advise ---Thank you

## 2023-04-24 ENCOUNTER — TELEPHONE (OUTPATIENT)
Dept: CARDIOLOGY | Facility: CLINIC | Age: 78
End: 2023-04-24
Payer: MEDICARE

## 2023-04-24 NOTE — TELEPHONE ENCOUNTER
----- Message from [employer] sent at 4/24/2023 11:08 AM CDT -----  Regarding: sooner appt  Type:  Sooner Appointment Request    Caller is requesting a sooner appointment.  Caller declined first available appointment listed below.  Caller will not accept being placed on the waitlist and is requesting a message be sent to doctor.    Name of Caller:  pt   When is the first available appointment?  Dept booking   Symptoms:  follow up   Best Call Back Number:  [phone]    Additional Information:  no open schedule for me.

## 2023-04-24 NOTE — TELEPHONE ENCOUNTER
----- Message from Isai Cornejo sent at 4/24/2023  3:13 PM CDT -----  Regarding: Return Call  Contact: Patient  Type:  Patient Returning Call    Who Called:Patient  Who Left Message for Patient:office staff please call back  Does the patient know what this is regarding?:appointment  Would the patient rather a call back or a response via [facility]? call  Best Call Back Number:[phone] or [phone]  Additional Information: Please call patient back.

## 2023-04-27 ENCOUNTER — TELEPHONE (OUTPATIENT)
Dept: CARDIOLOGY | Facility: CLINIC | Age: 78
End: 2023-04-27
Payer: MEDICARE

## 2023-04-27 NOTE — TELEPHONE ENCOUNTER
----- Message from Salima Monae sent at 4/27/2023  9:29 AM CDT -----  Contact: pt  Pt is calling and would like a call back   Please give pt a call back [phone]

## 2023-06-29 ENCOUNTER — OFFICE VISIT (OUTPATIENT)
Dept: CARDIOLOGY | Facility: CLINIC | Age: 78
End: 2023-06-29
Payer: MEDICARE

## 2023-06-29 VITALS
HEART RATE: 60 BPM | SYSTOLIC BLOOD PRESSURE: 122 MMHG | OXYGEN SATURATION: 98 % | DIASTOLIC BLOOD PRESSURE: 60 MMHG | WEIGHT: 158 LBS | HEIGHT: 65 IN | BODY MASS INDEX: 26.33 KG/M2 | RESPIRATION RATE: 16 BRPM

## 2023-06-29 DIAGNOSIS — E03.9 ACQUIRED HYPOTHYROIDISM: ICD-10-CM

## 2023-06-29 DIAGNOSIS — F41.9 ANXIETY DISORDER, UNSPECIFIED TYPE: ICD-10-CM

## 2023-06-29 DIAGNOSIS — I51.7 MILD LEFT ATRIAL ENLARGEMENT: ICD-10-CM

## 2023-06-29 DIAGNOSIS — I25.10 CAD IN NATIVE ARTERY: Primary | ICD-10-CM

## 2023-06-29 DIAGNOSIS — R94.31 NONSPECIFIC ABNORMAL ELECTROCARDIOGRAM (ECG) (EKG): ICD-10-CM

## 2023-06-29 DIAGNOSIS — I07.1 MODERATE TRICUSPID REGURGITATION: ICD-10-CM

## 2023-06-29 DIAGNOSIS — I10 ESSENTIAL HYPERTENSION: ICD-10-CM

## 2023-06-29 DIAGNOSIS — Z95.1 HX OF CABG: ICD-10-CM

## 2023-06-29 DIAGNOSIS — E11.40 TYPE 2 DIABETES MELLITUS WITH DIABETIC NEUROPATHY, WITHOUT LONG-TERM CURRENT USE OF INSULIN: ICD-10-CM

## 2023-06-29 DIAGNOSIS — K22.2 ESOPHAGEAL STRICTURE: ICD-10-CM

## 2023-06-29 DIAGNOSIS — E78.2 MIXED HYPERLIPIDEMIA: ICD-10-CM

## 2023-06-29 PROCEDURE — 93010 ELECTROCARDIOGRAM REPORT: CPT | Mod: S$PBB,,, | Performed by: INTERNAL MEDICINE

## 2023-06-29 PROCEDURE — 99999 PR PBB SHADOW E&M-EST. PATIENT-LVL IV: CPT | Mod: PBBFAC,,, | Performed by: INTERNAL MEDICINE

## 2023-06-29 PROCEDURE — 93010 EKG 12-LEAD: ICD-10-PCS | Mod: S$PBB,,, | Performed by: INTERNAL MEDICINE

## 2023-06-29 PROCEDURE — 99214 PR OFFICE/OUTPT VISIT, EST, LEVL IV, 30-39 MIN: ICD-10-PCS | Mod: S$PBB,,, | Performed by: INTERNAL MEDICINE

## 2023-06-29 PROCEDURE — 99214 OFFICE O/P EST MOD 30 MIN: CPT | Mod: PBBFAC,PN | Performed by: INTERNAL MEDICINE

## 2023-06-29 PROCEDURE — 93005 ELECTROCARDIOGRAM TRACING: CPT | Mod: PBBFAC,PN | Performed by: INTERNAL MEDICINE

## 2023-06-29 PROCEDURE — 99999 PR PBB SHADOW E&M-EST. PATIENT-LVL IV: ICD-10-PCS | Mod: PBBFAC,,, | Performed by: INTERNAL MEDICINE

## 2023-06-29 PROCEDURE — 99214 OFFICE O/P EST MOD 30 MIN: CPT | Mod: S$PBB,,, | Performed by: INTERNAL MEDICINE

## 2023-06-29 NOTE — PROGRESS NOTES
Subjective:    Patient ID:  Maylin Birmingham is a 78 y.o. female     Chief Complaint   Patient presents with    Hyperlipidemia    Hypertension       HPI:  Ms Maylin Birmingham is a 78 y.o. female is here for follow-up.    Patient has been doing well she was diagnosed with anemia and has been on supplements.  Her breathing is good denies any shortness of breath does have some exertional dyspnea on exertion.  Denies any chest pain or tightness or heaviness.    Denies any exertional angina.  Patient recently had a mammogram done and is an old machine and she had significant pain after the mammogram done and she continues to have intermittent sharp shooting pains in the right side of the breast.    She is taking all her medications regularly.        Review of patient's allergies indicates:  No Known Allergies    Past Medical History:   Diagnosis Date    Bradycardia     CAD (coronary artery disease)     Diabetes     HTN (hypertension)     Hyperlipidemia     Thyroid disease      Past Surgical History:   Procedure Laterality Date    CATARACT EXTRACTION Bilateral     CORONARY ARTERY BYPASS GRAFT      HYSTERECTOMY      INSERT / REPLACE / REMOVE PACEMAKER       Social History     Tobacco Use    Smoking status: Never    Smokeless tobacco: Never   Substance Use Topics    Alcohol use: Never    Drug use: Never     Family History   Problem Relation Age of Onset    Diabetes Mother     Cancer Brother     Breast cancer Sister     Breast cancer Sister     Breast cancer Maternal Aunt         Review of Systems:   Constitution: Negative for diaphoresis and fever.   HEENT: Negative for nosebleeds.    Cardiovascular: Negative for chest pain       Intermittent dyspnea on exertion       No leg swelling        No palpitations  Respiratory: Negative for shortness of breath and wheezing.    Hematologic/Lymphatic: Negative for bleeding problem. Does not bruise/bleed easily.   Skin: Negative for color change and rash.   Musculoskeletal: Negative  for falls and myalgias.   Gastrointestinal: Negative for hematemesis and hematochezia.   Genitourinary: Negative for hematuria.   Neurological: Negative for dizziness and light-headedness.   Psychiatric/Behavioral: Negative for altered mental status and memory loss.          Objective:        Vitals:    06/29/23 0928   BP: 122/60   Pulse: 60   Resp: 16       No results found for: WBC, HGB, HCT, PLT, CHOL, TRIG, HDL, LDLDIRECT, ALT, AST, NA, K, CL, CREATININE, BUN, CO2, TSH, PSA, INR, GLUF, HGBA1C, MICROALBUR     ECHOCARDIOGRAM RESULTS  Results for orders placed during the hospital encounter of 05/20/21    Echo Color Flow Doppler? Yes    Interpretation Summary  · The left ventricle is normal in size with normal systolic function.  · Normal left ventricular diastolic function.  · The estimated ejection fraction is 70%.  · Normal right ventricular size with normal right ventricular systolic function.  · Moderate to severe tricuspid regurgitation.  · Mild mitral regurgitation.  · There is mild pulmonary hypertension.        CURRENT/PREVIOUS VISIT EKG  Results for orders placed or performed in visit on 01/13/23   IN OFFICE EKG 12-LEAD (to [place])    Collection Time: 01/13/23  9:19 AM    Narrative    Test Reason : R94.31,    Vent. Rate : 067 BPM     Atrial Rate : 067 BPM     P-R Int : 172 ms          QRS Dur : 104 ms      QT Int : 408 ms       P-R-T Axes : 089 084 074 degrees     QTc Int : 431 ms    Normal sinus rhythm  Incomplete right bundle branch block  Borderline Abnormal ECG  When compared with ECG of 09-DEC-2022 09:03,  No significant change was found  Confirmed by Slade Oconnor MD ([id number]) on 1/29/2023 3:16:40 PM    Referred By:             Confirmed By:Slade Oconnor MD     No valid procedures specified.   No results found for this or any previous visit.      Physical Exam:  CONSTITUTIONAL: No fever, no chills  HEENT: Normocephalic, atraumatic,pupils reactive to light                 NECK:  No JVD no carotid  bruit  CVS: S1S2+, RRR, systolic murmurs,   LUNGS: Clear  ABDOMEN: Soft, NT, BS+  EXTREMITIES: No cyanosis, edema  : No olivia catheter  NEURO: AAO X 3  PSY: Normal affect      Medication List with Changes/Refills   Current Medications    ALPRAZOLAM (XANAX) 1 MG TABLET    Take 1 mg by mouth once daily.    AMLODIPINE (NORVASC) 2.5 MG TABLET    Take 1 tablet (2.5 mg total) by mouth 2 (two) times daily.    ATENOLOL (TENORMIN) 25 MG TABLET    TAKE (1) TABLET BY MOUTH ONCE DAILY.    ATENOLOL (TENORMIN) 25 MG TABLET    Take 1 tablet (25 mg total) by mouth once daily.    ATORVASTATIN (LIPITOR) 80 MG TABLET    Take 80 mg by mouth.    CLOPIDOGREL (PLAVIX) 75 MG TABLET    TAKE (1) TABLET BY MOUTH ONCE DAILY.    CLOPIDOGREL (PLAVIX) 75 MG TABLET    Take 1 tablet (75 mg total) by mouth once daily.    CYANOCOBALAMIN, VITAMIN B-12, 1,000 MCG/15 ML LIQD    Take by mouth.    DULOXETINE (CYMBALTA) 30 MG CAPSULE    Take 30 mg by mouth every evening.    GLIMEPIRIDE (AMARYL) 1 MG TABLET    Take 1 mg by mouth once daily.    IBUPROFEN (ADVIL,MOTRIN) 600 MG TABLET    Take 600 mg by mouth 3 (three) times daily.    JANUMET  MG PER TABLET    Take 1 tablet by mouth 2 (two) times daily.    LOSARTAN (COZAAR) 50 MG TABLET    Take 50 mg by mouth once daily.    MELATONIN 1 MG TBDL    Take by mouth.    NITROGLYCERIN (NITROSTAT) 0.4 MG SL TABLET    Place 1 tablet (0.4 mg total) under the tongue every 5 (five) minutes as needed for Chest pain.    OMEPRAZOLE (PRILOSEC) 10 MG CAPSULE    Take 10 mg by mouth once daily.    PIOGLITAZONE (ACTOS) 15 MG TABLET    Take 15 mg by mouth once daily.    SYNTHROID 88 MCG TABLET    Take 88 mcg by mouth once daily.    TRUE METRIX GLUCOSE TEST STRIP STRP    SMARTSIG:Via Meter    TRUEPLUS LANCETS 28 GAUGE MISC    Apply topically 3 (three) times daily.   Discontinued Medications    AMLODIPINE (NORVASC) 2.5 MG TABLET    TAKE (1) TABLET BY MOUTH TWICE A DAY.    ATORVASTATIN (LIPITOR) 40 MG TABLET    Take 40 mg by  mouth every evening.             Assessment:       1. CAD in native artery    2. Hx of CABG    3. Essential hypertension    4. Mixed hyperlipidemia    5. Acquired hypothyroidism    6. Mild left atrial enlargement    7. Moderate tricuspid regurgitation    8. Esophageal stricture    9. Anxiety disorder, unspecified type    10. Type 2 diabetes mellitus with diabetic neuropathy, without long-term current use of insulin    11. Nonspecific abnormal electrocardiogram (ECG) (EKG)         Plan:   1. CAD/CABG   Patient is currently on Plavix 75 mg p.o. daily continue the same no bleeding issues of blood in the stool urine.  2. Essential hypertension  Patient's blood pressure is very well controlled is 122/60 and she is on losartan 50 mg p.o. daily continue the same atenolol 25 mg p.o. daily and amlodipine 2.5 mg p.o. b.i.d..    3. Anxiety disorder   She is currently on alprazolam 1 mg on a daily basis.  She takes half tablet b.i.d. she follows up with the primary physician..    4. Acquired hypothyroidism   She is currently on Synthroid 88 mcg p.o. daily continue the same and she follows up with primary physician is checking thyroid function tests.  5. Mixed hyperlipidemia  She is currently on atorvastatin 80 mg p.o. daily continue the same and the primary physician is checking her cholesterol and liver function tests.    6. Diabetes mellitus   She is currently on Actos 15 mg p.o. daily Janumet  mg 1 tablet p.o. b.i.d. and glimepiride 1 mg p.o. daily continue the same and she follows up with the primary physician in regards with the diabetes.    7. EKG  Reviewed EKG independently patient is in normal sinus rhythm with a heart rate of 60 beats per minute normal intervals and incomplete right bundle branch block no acute ST T-wave changes.    8. Anemia   Patient does follow-up with an hematologist in regards with the anemia.  9. Continue current management I will see her back in the office in 6 months  time.              Problem List Items Addressed This Visit          Cardiac/Vascular    Hx of CABG    Mixed hyperlipidemia    Essential hypertension    Nonspecific abnormal electrocardiogram (ECG) (EKG)    Mild left atrial enlargement    Moderate tricuspid regurgitation       Endocrine    Type 2 diabetes mellitus with diabetic neuropathy, without long-term current use of insulin    Acquired hypothyroidism       GI    Esophageal stricture     Other Visit Diagnoses       CAD in native artery    -  Primary    Anxiety disorder, unspecified type                No follow-ups on file.

## 2023-06-30 RX ORDER — ATENOLOL 25 MG/1
25 TABLET ORAL DAILY
Qty: 90 TABLET | Refills: 3 | Status: SHIPPED | OUTPATIENT
Start: 2023-06-30 | End: 2024-01-05 | Stop reason: SDUPTHER

## 2023-06-30 RX ORDER — AMLODIPINE BESYLATE 2.5 MG/1
2.5 TABLET ORAL 2 TIMES DAILY
Qty: 180 TABLET | Refills: 3 | Status: SHIPPED | OUTPATIENT
Start: 2023-06-30 | End: 2024-01-05 | Stop reason: SDUPTHER

## 2023-06-30 RX ORDER — CLOPIDOGREL BISULFATE 75 MG/1
75 TABLET ORAL DAILY
Qty: 90 TABLET | Refills: 3 | Status: SHIPPED | OUTPATIENT
Start: 2023-06-30 | End: 2024-01-05 | Stop reason: SDUPTHER

## 2023-11-21 ENCOUNTER — TELEPHONE (OUTPATIENT)
Dept: OBSTETRICS AND GYNECOLOGY | Facility: CLINIC | Age: 78
End: 2023-11-21
Payer: MEDICARE

## 2023-11-21 DIAGNOSIS — Z12.31 ENCOUNTER FOR SCREENING MAMMOGRAM FOR BREAST CANCER: Primary | ICD-10-CM

## 2023-11-21 NOTE — TELEPHONE ENCOUNTER
----- Message from Semaj Diaz MA sent at 11/21/2023  3:21 PM CST -----  Contact: PT    ----- Message -----  From: Kathleen Sanches  Sent: 11/21/2023  11:46 AM CST  To: Erna ZAMORA Staff    Type: Needs Medical Advice    Who Called: PT  Best Call Back Number: [phone]  Additional  Information: PT requesting to get a Mammogram put in system to be be done at [facility]  Please Advise- Thank you

## 2023-12-19 ENCOUNTER — OFFICE VISIT (OUTPATIENT)
Dept: OBSTETRICS AND GYNECOLOGY | Facility: CLINIC | Age: 78
End: 2023-12-19
Payer: MEDICARE

## 2023-12-19 VITALS
HEIGHT: 65 IN | BODY MASS INDEX: 25.93 KG/M2 | DIASTOLIC BLOOD PRESSURE: 62 MMHG | HEART RATE: 56 BPM | WEIGHT: 155.63 LBS | SYSTOLIC BLOOD PRESSURE: 123 MMHG

## 2023-12-19 DIAGNOSIS — Z12.31 ENCOUNTER FOR SCREENING MAMMOGRAM FOR BREAST CANCER: Primary | ICD-10-CM

## 2023-12-19 DIAGNOSIS — Z01.419 ENCOUNTER FOR WELL WOMAN EXAM WITH ROUTINE GYNECOLOGICAL EXAM: ICD-10-CM

## 2023-12-19 PROCEDURE — G0101 PR CA SCREEN;PELVIC/BREAST EXAM: ICD-10-PCS | Mod: GZ,S$GLB,, | Performed by: OBSTETRICS & GYNECOLOGY

## 2023-12-19 PROCEDURE — G0101 CA SCREEN;PELVIC/BREAST EXAM: HCPCS | Mod: GZ,S$GLB,, | Performed by: OBSTETRICS & GYNECOLOGY

## 2023-12-19 NOTE — PROGRESS NOTES
"CC: Well woman exam    Maylin Birmingham is a 78 y.o. female  presents for well woman exam.  LMP: No LMP recorded. Patient has had a hysterectomy..   Patients last pap was hysterectomy.  Last wellness labs were done .  Last mammogram was .  Last colonoscopy was .  Primary care is .  SBE yes  No issues, problems, or complaints. Due for wellness and breast exam and ovary exams.  Doing well with no complaints.    Chief Complaint   Patient presents with    Annual Exam        Past Medical History:   Diagnosis Date    Bradycardia     CAD (coronary artery disease)     Diabetes     HTN (hypertension)     Hyperlipidemia     Thyroid disease      Past Surgical History:   Procedure Laterality Date    CATARACT EXTRACTION Bilateral     CORONARY ARTERY BYPASS GRAFT      HYSTERECTOMY      INSERT / REPLACE / REMOVE PACEMAKER       Social History     Socioeconomic History    Marital status:    Tobacco Use    Smoking status: Never    Smokeless tobacco: Never   Substance and Sexual Activity    Alcohol use: Never    Drug use: Never    Sexual activity: Yes     Family History   Problem Relation Age of Onset    Diabetes Mother     Cancer Brother     Breast cancer Sister     Breast cancer Sister     Breast cancer Maternal Aunt      OB History          3    Para   3    Term   3            AB        Living             SAB        IAB        Ectopic        Multiple        Live Births                     /62   Pulse (!) 56   Ht 5' 5" (1.651 m)   Wt 70.6 kg (155 lb 9.6 oz)   BMI 25.89 kg/m²       ROS:  GENERAL: Denies weight gain or weight loss. Feeling well overall.   SKIN: Denies rash or lesions.   HEAD: Denies head injury or headache.   NODES: Denies enlarged lymph nodes.   CHEST: Denies chest pain or shortness of breath.   CARDIOVASCULAR: Denies palpitations or left sided chest pain.   ABDOMEN: No abdominal pain, constipation, diarrhea, nausea, vomiting or rectal bleeding.   URINARY: No " frequency, dysuria, hematuria, or burning on urination.  REPRODUCTIVE: See HPI.   BREASTS: The patient performs breast self-examination and denies pain, lumps, or nipple discharge.   HEMATOLOGIC: No easy bruisability or excessive bleeding.   MUSCULOSKELETAL: Denies joint pain or swelling.   NEUROLOGIC: Denies syncope or weakness.   PSYCHIATRIC: Denies depression, anxiety or mood swings.    PHYSICAL EXAM:  APPEARANCE: Well nourished, well developed, in no acute distress.  AFFECT: WNL, alert and oriented x 3  SKIN: No acne or hirsutism  NECK: Neck symmetric without masses or thyromegaly  NODES: No inguinal, cervical, or axillary lymph node enlargement  CHEST: Good respiratory effect  ABDOMEN: Soft.  No tenderness or masses.  No hepatosplenomegaly.  No hernias.  BREASTS: Symmetrical, no skin changes or visible lesions.  No palpable masses, nipple discharge bilaterally.  PELVIC:   Uterus and cervix are absent Adnexa without masses or tenderness.    EXTREMITIES: No edema.    Encounter for screening mammogram for breast cancer    Encounter for well woman exam with routine gynecological exam            Patient was counseled today on A.C.S. Pap guidelines and recommendations for yearly pelvic exams, mammograms and monthly self breast exams; to see her PCP for other health maintenance.     Follow up in about 1 year (around 12/19/2024) for  wellness and breast exam.                 wendy

## 2024-01-02 ENCOUNTER — HOSPITAL ENCOUNTER (OUTPATIENT)
Dept: RADIOLOGY | Facility: HOSPITAL | Age: 79
Discharge: HOME | End: 2024-01-02
Attending: NURSE PRACTITIONER
Payer: MEDICARE

## 2024-01-02 DIAGNOSIS — Z12.31 ENCOUNTER FOR SCREENING MAMMOGRAM FOR BREAST CANCER: ICD-10-CM

## 2024-01-02 PROCEDURE — 77067 SCR MAMMO BI INCL CAD: CPT | Mod: TC

## 2024-01-02 PROCEDURE — 77067 SCR MAMMO BI INCL CAD: CPT | Mod: 26,,, | Performed by: RADIOLOGY

## 2024-01-02 PROCEDURE — 77063 BREAST TOMOSYNTHESIS BI: CPT | Mod: 26,,, | Performed by: RADIOLOGY

## 2024-01-05 ENCOUNTER — OFFICE VISIT (OUTPATIENT)
Dept: CARDIOLOGY | Facility: CLINIC | Age: 79
End: 2024-01-05
Payer: MEDICARE

## 2024-01-05 ENCOUNTER — TELEPHONE (OUTPATIENT)
Dept: CARDIOLOGY | Facility: CLINIC | Age: 79
End: 2024-01-05

## 2024-01-05 VITALS
BODY MASS INDEX: 25.99 KG/M2 | RESPIRATION RATE: 16 BRPM | HEART RATE: 62 BPM | DIASTOLIC BLOOD PRESSURE: 72 MMHG | HEIGHT: 65 IN | OXYGEN SATURATION: 98 % | WEIGHT: 156 LBS | SYSTOLIC BLOOD PRESSURE: 126 MMHG

## 2024-01-05 DIAGNOSIS — E11.40 TYPE 2 DIABETES MELLITUS WITH DIABETIC NEUROPATHY, WITHOUT LONG-TERM CURRENT USE OF INSULIN: ICD-10-CM

## 2024-01-05 DIAGNOSIS — Z95.1 HX OF CABG: ICD-10-CM

## 2024-01-05 DIAGNOSIS — I07.1 MODERATE TRICUSPID REGURGITATION: ICD-10-CM

## 2024-01-05 DIAGNOSIS — I25.10 CAD IN NATIVE ARTERY: ICD-10-CM

## 2024-01-05 DIAGNOSIS — I27.29 OTHER SECONDARY PULMONARY HYPERTENSION: ICD-10-CM

## 2024-01-05 DIAGNOSIS — I50.31 ACUTE DIASTOLIC HEART FAILURE: Primary | ICD-10-CM

## 2024-01-05 DIAGNOSIS — I10 ESSENTIAL HYPERTENSION: ICD-10-CM

## 2024-01-05 DIAGNOSIS — E03.9 ACQUIRED HYPOTHYROIDISM: ICD-10-CM

## 2024-01-05 DIAGNOSIS — I51.7 MILD LEFT ATRIAL ENLARGEMENT: ICD-10-CM

## 2024-01-05 DIAGNOSIS — E78.2 MIXED HYPERLIPIDEMIA: ICD-10-CM

## 2024-01-05 DIAGNOSIS — R94.31 NONSPECIFIC ABNORMAL ELECTROCARDIOGRAM (ECG) (EKG): ICD-10-CM

## 2024-01-05 DIAGNOSIS — K22.2 ESOPHAGEAL STRICTURE: ICD-10-CM

## 2024-01-05 DIAGNOSIS — F41.9 ANXIETY DISORDER, UNSPECIFIED TYPE: ICD-10-CM

## 2024-01-05 PROCEDURE — 99215 OFFICE O/P EST HI 40 MIN: CPT | Mod: S$PBB,,, | Performed by: INTERNAL MEDICINE

## 2024-01-05 PROCEDURE — 99999 PR PBB SHADOW E&M-EST. PATIENT-LVL IV: CPT | Mod: PBBFAC,,, | Performed by: INTERNAL MEDICINE

## 2024-01-05 PROCEDURE — 93005 ELECTROCARDIOGRAM TRACING: CPT | Mod: PBBFAC,PN | Performed by: INTERNAL MEDICINE

## 2024-01-05 PROCEDURE — 99214 OFFICE O/P EST MOD 30 MIN: CPT | Mod: PBBFAC,PN | Performed by: INTERNAL MEDICINE

## 2024-01-05 PROCEDURE — 93010 ELECTROCARDIOGRAM REPORT: CPT | Mod: S$PBB,,, | Performed by: INTERNAL MEDICINE

## 2024-01-05 RX ORDER — ATENOLOL 25 MG/1
25 TABLET ORAL DAILY
Qty: 90 TABLET | Refills: 3 | Status: SHIPPED | OUTPATIENT
Start: 2024-01-05 | End: 2025-01-04

## 2024-01-05 RX ORDER — CLOPIDOGREL BISULFATE 75 MG/1
75 TABLET ORAL DAILY
Qty: 90 TABLET | Refills: 3 | Status: SHIPPED | OUTPATIENT
Start: 2024-01-05 | End: 2025-01-04

## 2024-01-05 RX ORDER — NITROGLYCERIN 0.4 MG/1
0.4 TABLET SUBLINGUAL EVERY 5 MIN PRN
Qty: 30 TABLET | Refills: 6 | Status: SHIPPED | OUTPATIENT
Start: 2024-01-05 | End: 2025-01-04

## 2024-01-05 RX ORDER — LOSARTAN POTASSIUM 50 MG/1
50 TABLET ORAL DAILY
Qty: 90 TABLET | Refills: 3 | Status: SHIPPED | OUTPATIENT
Start: 2024-01-05 | End: 2025-01-04

## 2024-01-05 RX ORDER — AMLODIPINE BESYLATE 2.5 MG/1
2.5 TABLET ORAL 2 TIMES DAILY
Qty: 180 TABLET | Refills: 3 | Status: SHIPPED | OUTPATIENT
Start: 2024-01-05 | End: 2025-01-04

## 2024-01-05 NOTE — PROGRESS NOTES
Subjective:    Patient ID:  Maylin Birmingham is a 78 y.o. female     Chief Complaint   Patient presents with    Hyperlipidemia    Hypertension    Coronary Artery Disease       HPI:  Ms Maylin Birmingham is a 78 y.o. female here for follow-up.    She is doing well at the present time.  Denies any chest pain or tightness heaviness denies any shortness a breath or difficulty in breathing.  She is taking all her medications regularly.    Some of the medications he is running out.  And  I am filling out the medications.      Review of patient's allergies indicates:  No Known Allergies    Past Medical History:   Diagnosis Date    Bradycardia     CAD (coronary artery disease)     Diabetes     HTN (hypertension)     Hyperlipidemia     Thyroid disease      Past Surgical History:   Procedure Laterality Date    CATARACT EXTRACTION Bilateral     CORONARY ARTERY BYPASS GRAFT      HYSTERECTOMY      INSERT / REPLACE / REMOVE PACEMAKER       Social History     Tobacco Use    Smoking status: Never    Smokeless tobacco: Never   Substance Use Topics    Alcohol use: Never    Drug use: Never     Family History   Problem Relation Age of Onset    Diabetes Mother     Cancer Brother     Breast cancer Sister     Breast cancer Sister     Breast cancer Maternal Aunt         Review of Systems:   Constitution: Negative for diaphoresis and fever.   HEENT: Negative for nosebleeds.    Cardiovascular: Negative for chest pain       No dyspnea on exertion       No leg swelling        No palpitations  Respiratory: Negative for shortness of breath and wheezing.    Hematologic/Lymphatic: Negative for bleeding problem. Does not bruise/bleed easily.   Skin: Negative for color change and rash.   Musculoskeletal: Negative for falls and myalgias.   Gastrointestinal: Negative for hematemesis and hematochezia.   Genitourinary: Negative for hematuria.   Neurological: Negative for dizziness and light-headedness.   Psychiatric/Behavioral: Negative for altered  "mental status and memory loss.          Objective:        Vitals:    01/05/24 1042   BP: 126/72   Pulse: 62   Resp: 16       No results found for: "WBC", "HGB", "HCT", "PLT", "CHOL", "TRIG", "HDL", "LDLDIRECT", "ALT", "AST", "NA", "K", "CL", "CREATININE", "BUN", "CO2", "TSH", "PSA", "INR", "GLUF", "HGBA1C", "MICROALBUR"     ECHOCARDIOGRAM RESULTS  Results for orders placed during the hospital encounter of 05/20/21    Echo Color Flow Doppler? Yes    Interpretation Summary  · The left ventricle is normal in size with normal systolic function.  · Normal left ventricular diastolic function.  · The estimated ejection fraction is 70%.  · Normal right ventricular size with normal right ventricular systolic function.  · Moderate to severe tricuspid regurgitation.  · Mild mitral regurgitation.  · There is mild pulmonary hypertension.        CURRENT/PREVIOUS VISIT EKG  Results for orders placed or performed in visit on 06/29/23   IN OFFICE EKG 12-LEAD (to [facility])    Collection Time: 06/29/23  9:36 AM    Narrative    Test Reason : R94.31,    Vent. Rate : 060 BPM     Atrial Rate : 060 BPM     P-R Int : 184 ms          QRS Dur : 112 ms      QT Int : 438 ms       P-R-T Axes : 084 078 059 degrees     QTc Int : 438 ms    Normal sinus rhythm  Incomplete right bundle branch block  Borderline Abnormal ECG  When compared with ECG of 13-JAN-2023 09:19,  No significant change was found  Confirmed by Arcadio Oconnor MD ([id number]) on 7/21/2023 7:17:20 PM    Referred By:             Confirmed By:Arcadio Oconnor MD     No valid procedures specified.   No results found for this or any previous visit.      Physical Exam:  CONSTITUTIONAL: No fever, no chills  HEENT: Normocephalic, atraumatic,pupils reactive to light                 NECK:  No JVD no carotid bruit  CVS: S1S2+, RRR, systolic murmurs,   LUNGS: Clear  ABDOMEN: Soft, NT, BS+  EXTREMITIES: No cyanosis, edema  : No olivia catheter  NEURO: AAO X 3  PSY: Normal affect      Medication List " with Changes/Refills   Current Medications    ALPRAZOLAM (XANAX) 1 MG TABLET    Take 1 mg by mouth once daily.    ATENOLOL (TENORMIN) 25 MG TABLET    TAKE (1) TABLET BY MOUTH ONCE DAILY.    ATORVASTATIN (LIPITOR) 80 MG TABLET    Take 80 mg by mouth.    CLOPIDOGREL (PLAVIX) 75 MG TABLET    TAKE (1) TABLET BY MOUTH ONCE DAILY.    CYANOCOBALAMIN, VITAMIN B-12, 1,000 MCG/15 ML LIQD    Take by mouth.    DULOXETINE (CYMBALTA) 30 MG CAPSULE    Take 30 mg by mouth every evening.    GLIMEPIRIDE (AMARYL) 1 MG TABLET    Take 1 mg by mouth once daily.    IBUPROFEN (ADVIL,MOTRIN) 600 MG TABLET    Take 600 mg by mouth 3 (three) times daily.    JANUMET  MG PER TABLET    Take 1 tablet by mouth 2 (two) times daily.    MELATONIN 1 MG TBDL    Take by mouth.    OMEPRAZOLE (PRILOSEC) 10 MG CAPSULE    Take 10 mg by mouth once daily.    PIOGLITAZONE (ACTOS) 15 MG TABLET    Take 15 mg by mouth once daily.    SYNTHROID 88 MCG TABLET    Take 88 mcg by mouth once daily.    TRUE METRIX GLUCOSE TEST STRIP STRP    SMARTSIG:Via Meter    TRUEPLUS LANCETS 28 GAUGE MISC    Apply topically 3 (three) times daily.   Changed and/or Refilled Medications    Modified Medication Previous Medication    AMLODIPINE (NORVASC) 2.5 MG TABLET amLODIPine (NORVASC) 2.5 MG tablet       Take 1 tablet (2.5 mg total) by mouth 2 (two) times daily.    Take 1 tablet (2.5 mg total) by mouth 2 (two) times daily.    ATENOLOL (TENORMIN) 25 MG TABLET atenoloL (TENORMIN) 25 MG tablet       Take 1 tablet (25 mg total) by mouth once daily.    Take 1 tablet (25 mg total) by mouth once daily.    CLOPIDOGREL (PLAVIX) 75 MG TABLET clopidogreL (PLAVIX) 75 mg tablet       Take 1 tablet (75 mg total) by mouth once daily.    Take 1 tablet (75 mg total) by mouth once daily.    LOSARTAN (COZAAR) 50 MG TABLET losartan (COZAAR) 50 MG tablet       Take 1 tablet (50 mg total) by mouth once daily.    Take 50 mg by mouth once daily.    NITROGLYCERIN (NITROSTAT) 0.4 MG SL TABLET  nitroGLYCERIN (NITROSTAT) 0.4 MG SL tablet       Place 1 tablet (0.4 mg total) under the tongue every 5 (five) minutes as needed for Chest pain.    Place 1 tablet (0.4 mg total) under the tongue every 5 (five) minutes as needed for Chest pain.             Assessment:       1. Acute diastolic heart failure    2. CAD in native artery    3. Hx of CABG    4. Essential hypertension    5. Mixed hyperlipidemia    6. Acquired hypothyroidism    7. Moderate tricuspid regurgitation    8. Esophageal stricture    9. Mild left atrial enlargement    10. Anxiety disorder, unspecified type    11. Other secondary pulmonary hypertension    12. Type 2 diabetes mellitus with diabetic neuropathy, without long-term current use of insulin    13. Nonspecific abnormal electrocardiogram (ECG) (EKG)         Plan:   1. Diastolic heart failure   Patient is stable at the present time.  She is currently on atenolol 25 mg p.o. daily losartan 50 mg p.o. daily and amlodipine 2.5 mg p.o. b.i.d.  continue the same.  2. CAD status post CABG.    Patient is currently on Plavix 75 mg p.o. daily continue the same no bleeding issues of blood in the stool or urine.  And she is also on atenolol 25 mg p.o. daily.    3. Essential hypertension  Patient's blood pressure is well controlled is 126/72 and she is on losartan 50 mg p.o. daily atenolol 25 mg p.o. daily amlodipine 2.5 mg p.o. b.i.d. continue the same.    4. Mixed hyperlipidemia   Patient is currently on atorvastatin 80 mg p.o. daily continue the same and her primary physician is checking her cholesterol and liver function test  5. Esophageal stricture   Patient has a gastroenterologist she recently had a colonoscopy and she is doing well and she follows up with the gastroenterologist.    6. Left atrial enlargement and tricuspid regurgitation   Patient is stable at the present time.  Will repeat her echocardiogram in 6 months time.  7. Anxiety disorder  Currently she is on Cymbalta 30 mg p.o. daily  continue the same   8. Diabetes mellitus   She is currently on glimepiride 1 mg p.o. daily Janumet 1 tablet p.o. b.i.d. and she is doing well.  Continue the same she follows up with the primary physician.    9. Acquired hypothyroidism   She is on Synthroid 88 mcg p.o. daily continue the same and she follows up with the primary physician in regards with a thyroid function tests.    10. EKG   Reviewed EKG independently patient is in normal sinus rhythm with a heart rate of 62 beats per minute incomplete right bundle branch block.  Normal intervals no acute ST T-wave changes.    11. Patient to continue current management I will see him back in the office in 6 months time.          Problem List Items Addressed This Visit          Cardiac/Vascular    Hx of CABG    Mixed hyperlipidemia    Essential hypertension    Nonspecific abnormal electrocardiogram (ECG) (EKG)    Relevant Orders    IN OFFICE EKG 12-LEAD (to [facility])    Mild left atrial enlargement    Moderate tricuspid regurgitation    Other secondary pulmonary hypertension       Endocrine    Type 2 diabetes mellitus with diabetic neuropathy, without long-term current use of insulin    Acquired hypothyroidism       GI    Esophageal stricture     Other Visit Diagnoses       Acute diastolic heart failure    -  Primary    CAD in native artery        Anxiety disorder, unspecified type                No follow-ups on file.

## 2024-01-05 NOTE — TELEPHONE ENCOUNTER
----- Message from Consuelo Bills, Patient Care Assistant sent at 1/5/2024  3:10 PM CST -----  Regarding: advice  Contact: pt  Type: Needs Medical Advice    Who Called:  pt     Best Call Back Number: [phone] (home)     Additional Information: pt states she would like a callback regarding ALPRAZolam (XANAX) 1 MG tablet. Please call to advise. Thanks!

## 2024-01-11 ENCOUNTER — HOSPITAL ENCOUNTER (OUTPATIENT)
Dept: CARDIOLOGY | Facility: HOSPITAL | Age: 79
Discharge: HOME | End: 2024-01-11
Attending: INTERNAL MEDICINE
Payer: MEDICARE

## 2024-01-11 VITALS — WEIGHT: 156 LBS | BODY MASS INDEX: 25.99 KG/M2 | HEIGHT: 65 IN

## 2024-01-11 DIAGNOSIS — K22.2 ESOPHAGEAL STRICTURE: ICD-10-CM

## 2024-01-11 DIAGNOSIS — I51.7 MILD LEFT ATRIAL ENLARGEMENT: ICD-10-CM

## 2024-01-11 DIAGNOSIS — E11.40 TYPE 2 DIABETES MELLITUS WITH DIABETIC NEUROPATHY, WITHOUT LONG-TERM CURRENT USE OF INSULIN: ICD-10-CM

## 2024-01-11 DIAGNOSIS — Z95.1 HX OF CABG: ICD-10-CM

## 2024-01-11 DIAGNOSIS — I27.29 OTHER SECONDARY PULMONARY HYPERTENSION: ICD-10-CM

## 2024-01-11 DIAGNOSIS — E03.9 ACQUIRED HYPOTHYROIDISM: ICD-10-CM

## 2024-01-11 DIAGNOSIS — F41.9 ANXIETY DISORDER, UNSPECIFIED TYPE: ICD-10-CM

## 2024-01-11 DIAGNOSIS — R94.31 NONSPECIFIC ABNORMAL ELECTROCARDIOGRAM (ECG) (EKG): ICD-10-CM

## 2024-01-11 DIAGNOSIS — I07.1 MODERATE TRICUSPID REGURGITATION: ICD-10-CM

## 2024-01-11 DIAGNOSIS — I25.10 CAD IN NATIVE ARTERY: ICD-10-CM

## 2024-01-11 DIAGNOSIS — E78.2 MIXED HYPERLIPIDEMIA: ICD-10-CM

## 2024-01-11 DIAGNOSIS — I10 ESSENTIAL HYPERTENSION: ICD-10-CM

## 2024-01-11 DIAGNOSIS — I50.31 ACUTE DIASTOLIC HEART FAILURE: ICD-10-CM

## 2024-01-11 LAB
AORTIC ROOT ANNULUS: 2.6 CM
AORTIC VALVE CUSP SEPERATION: 1.7 CM
AV INDEX (PROSTH): 0.88
AV MEAN GRADIENT: 2 MMHG
AV PEAK GRADIENT: 4 MMHG
AV VALVE AREA BY VELOCITY RATIO: 2.66 CM²
AV VALVE AREA: 2.76 CM²
AV VELOCITY RATIO: 0.85
BSA FOR ECHO PROCEDURE: 1.8 M2
CV ECHO LV RWT: 0.38 CM
DOP CALC AO PEAK VEL: 1.04 M/S
DOP CALC AO VTI: 23.2 CM
DOP CALC LVOT AREA: 3.1 CM2
DOP CALC LVOT DIAMETER: 2 CM
DOP CALC LVOT PEAK VEL: 0.88 M/S
DOP CALC LVOT STROKE VOLUME: 64.06 CM3
DOP CALCLVOT PEAK VEL VTI: 20.4 CM
E WAVE DECELERATION TIME: 183 MSEC
E/A RATIO: 1.48
E/E' RATIO: 9.14 M/S
ECHO LV POSTERIOR WALL: 0.85 CM (ref 0.6–1.1)
FRACTIONAL SHORTENING: 34 % (ref 28–44)
INTERVENTRICULAR SEPTUM: 0.91 CM (ref 0.6–1.1)
IVRT: 106 MSEC
LEFT ATRIUM VOLUME INDEX MOD: 35.1 ML/M2
LEFT ATRIUM VOLUME MOD: 62.5 CM3
LEFT INTERNAL DIMENSION IN SYSTOLE: 2.93 CM (ref 2.1–4)
LEFT VENTRICLE DIASTOLIC VOLUME INDEX: 51.12 ML/M2
LEFT VENTRICLE DIASTOLIC VOLUME: 91 ML
LEFT VENTRICLE MASS INDEX: 72 G/M2
LEFT VENTRICLE SYSTOLIC VOLUME INDEX: 18.5 ML/M2
LEFT VENTRICLE SYSTOLIC VOLUME: 33 ML
LEFT VENTRICULAR INTERNAL DIMENSION IN DIASTOLE: 4.47 CM (ref 3.5–6)
LEFT VENTRICULAR MASS: 127.47 G
LV LATERAL E/E' RATIO: 8 M/S
LV SEPTAL E/E' RATIO: 10.67 M/S
LVOT MG: 2 MMHG
LVOT MV: 0.59 CM/S
MV PEAK A VEL: 0.65 M/S
MV PEAK E VEL: 0.96 M/S
MV STENOSIS PRESSURE HALF TIME: 102 MS
MV VALVE AREA P 1/2 METHOD: 2.16 CM2
PISA TR MAX VEL: 2.37 M/S
RA PRESSURE ESTIMATED: 3 MMHG
RIGHT VENTRICULAR END-DIASTOLIC DIMENSION: 1.97 CM
RV TB RVSP: 5 MMHG
TDI LATERAL: 0.12 M/S
TDI SEPTAL: 0.09 M/S
TDI: 0.11 M/S
TR MAX PG: 22 MMHG
TV REST PULMONARY ARTERY PRESSURE: 25 MMHG
Z-SCORE OF LEFT VENTRICULAR DIMENSION IN END DIASTOLE: -0.96
Z-SCORE OF LEFT VENTRICULAR DIMENSION IN END SYSTOLE: -0.3

## 2024-01-11 PROCEDURE — 93306 TTE W/DOPPLER COMPLETE: CPT

## 2024-01-11 PROCEDURE — 93306 TTE W/DOPPLER COMPLETE: CPT | Mod: 26,,, | Performed by: INTERNAL MEDICINE

## 2024-02-21 ENCOUNTER — TELEPHONE (OUTPATIENT)
Dept: CARDIOLOGY | Facility: CLINIC | Age: 79
End: 2024-02-21
Payer: MEDICARE

## 2024-02-21 NOTE — TELEPHONE ENCOUNTER
----- Message from Shereen Vidal NP sent at 1/16/2024 12:45 PM CST -----  No concerning or significant abnormalities noted on echocardiogram.

## 2024-08-26 ENCOUNTER — OFFICE VISIT (OUTPATIENT)
Dept: CARDIOLOGY | Facility: CLINIC | Age: 79
End: 2024-08-26
Payer: MEDICARE

## 2024-08-26 VITALS
HEIGHT: 65 IN | HEART RATE: 66 BPM | RESPIRATION RATE: 16 BRPM | WEIGHT: 158 LBS | DIASTOLIC BLOOD PRESSURE: 74 MMHG | OXYGEN SATURATION: 98 % | BODY MASS INDEX: 26.33 KG/M2 | SYSTOLIC BLOOD PRESSURE: 132 MMHG

## 2024-08-26 DIAGNOSIS — R94.31 NONSPECIFIC ABNORMAL ELECTROCARDIOGRAM (ECG) (EKG): ICD-10-CM

## 2024-08-26 DIAGNOSIS — E11.40 TYPE 2 DIABETES MELLITUS WITH DIABETIC NEUROPATHY, WITHOUT LONG-TERM CURRENT USE OF INSULIN: ICD-10-CM

## 2024-08-26 DIAGNOSIS — E78.2 MIXED HYPERLIPIDEMIA: ICD-10-CM

## 2024-08-26 DIAGNOSIS — I10 ESSENTIAL HYPERTENSION: ICD-10-CM

## 2024-08-26 DIAGNOSIS — E03.9 ACQUIRED HYPOTHYROIDISM: ICD-10-CM

## 2024-08-26 DIAGNOSIS — K22.2 ESOPHAGEAL STRICTURE: ICD-10-CM

## 2024-08-26 DIAGNOSIS — Z95.1 HX OF CABG: ICD-10-CM

## 2024-08-26 DIAGNOSIS — I25.119 ATHEROSCLEROSIS OF NATIVE CORONARY ARTERY OF NATIVE HEART WITH ANGINA PECTORIS: ICD-10-CM

## 2024-08-26 DIAGNOSIS — I25.10 CAD IN NATIVE ARTERY: Primary | ICD-10-CM

## 2024-08-26 PROCEDURE — 99999 PR PBB SHADOW E&M-EST. PATIENT-LVL IV: CPT | Mod: PBBFAC,,, | Performed by: INTERNAL MEDICINE

## 2024-08-26 PROCEDURE — 99214 OFFICE O/P EST MOD 30 MIN: CPT | Mod: S$PBB,,, | Performed by: INTERNAL MEDICINE

## 2024-08-26 PROCEDURE — 99214 OFFICE O/P EST MOD 30 MIN: CPT | Mod: PBBFAC,PN | Performed by: INTERNAL MEDICINE

## 2024-08-26 NOTE — PROGRESS NOTES
Subjective:    Patient ID:  Maylin Birmingham is a 79 y.o. female     Chief Complaint   Patient presents with    Hyperlipidemia       HPI:  Ms Maylin Birmingham is a 79 y.o. female here for follow-up.    Patient is having issues with the asleep.  She can not sleep very well she had tried taking trazodone and it does not help her it causes side effects for her.    Patient is quite active and she does thigh she and walks on regular basis.  Her breathing is good denies any chest pain or tightness and denies any loss of consciousness.  She is taking her medications regularly.    Review of patient's allergies indicates:  No Known Allergies    Past Medical History:   Diagnosis Date    Bradycardia     CAD (coronary artery disease)     Diabetes     HTN (hypertension)     Hyperlipidemia     Thyroid disease      Past Surgical History:   Procedure Laterality Date    CATARACT EXTRACTION Bilateral     CORONARY ARTERY BYPASS GRAFT      HYSTERECTOMY      INSERT / REPLACE / REMOVE PACEMAKER       Social History     Tobacco Use    Smoking status: Never    Smokeless tobacco: Never   Substance Use Topics    Alcohol use: Never    Drug use: Never     Family History   Problem Relation Name Age of Onset    Diabetes Mother      Cancer Brother      Breast cancer Sister      Breast cancer Sister      Breast cancer Maternal Aunt          Review of Systems:   Constitution: Negative for diaphoresis and fever.   HEENT: Negative for nosebleeds.    Cardiovascular: Negative for chest pain       No dyspnea on exertion       No leg swelling        No palpitations  Respiratory: Negative for shortness of breath and wheezing.    Hematologic/Lymphatic: Negative for bleeding problem. Does not bruise/bleed easily.   Skin: Negative for color change and rash.   Musculoskeletal: Negative for falls and myalgias.   Gastrointestinal: Negative for hematemesis and hematochezia.   Genitourinary: Negative for hematuria.   Neurological: Negative for dizziness and  "light-headedness.   Psychiatric/Behavioral: Negative for altered mental status and memory loss. [place].         Objective:        Vitals:    08/26/24 1324   BP: 132/74   Pulse: 66   Resp: 16       No results found for: "WBC", "HGB", "HCT", "PLT", "CHOL", "TRIG", "HDL", "LDLDIRECT", "ALT", "AST", "NA", "K", "CL", "CREATININE", "BUN", "CO2", "TSH", "PSA", "INR", "GLUF", "HGBA1C", "MICROALBUR"     ECHOCARDIOGRAM RESULTS  Results for orders placed during the hospital encounter of 01/11/24    Echo    Interpretation Summary    Left Ventricle: The left ventricle is normal in size. Normal wall thickness. Normal wall motion. There is normal systolic function with a visually estimated ejection fraction of 60 - 65%. There is normal diastolic function.    Right Ventricle: Normal right ventricular cavity size. Wall thickness is normal. Right ventricle wall motion  is normal. Systolic function is normal.    Mitral Valve: There is mild posterior mitral annular calcification present. There is mild regurgitation with a centrally directed jet.    Tricuspid Valve: There is mild regurgitation with a centrally directed jet.    Pulmonary Artery: The estimated pulmonary artery systolic pressure is 25 mmHg.    IVC/SVC: Normal venous pressure at 3 mmHg.        CURRENT/PREVIOUS VISIT EKG  Results for orders placed or performed in visit on 01/05/24   IN OFFICE EKG 12-LEAD (to [facility])    Collection Time: 01/05/24 10:39 AM    Narrative    Test Reason : R94.31,    Vent. Rate : 062 BPM     Atrial Rate : 062 BPM     P-R Int : 166 ms          QRS Dur : 104 ms      QT Int : 438 ms       P-R-T Axes : 086 083 072 degrees     QTc Int : 444 ms    Normal sinus rhythm  Incomplete right bundle branch block  Borderline Abnormal ECG  When compared with ECG of 29-JUN-2023 09:36,  No significant change was found  Confirmed by Slade Oconnor MD ([id number]) on 1/25/2024 1:26:17 PM    Referred By:             Confirmed By:Slade Oconnor MD     No valid procedures " specified.   No results found for this or any previous visit.      Physical Exam:  CONSTITUTIONAL: No fever, no chills  HEENT: Normocephalic, atraumatic,pupils reactive to light                 NECK:  No JVD no carotid bruit  CVS: S1S2+, RRR, systolic murmurs,   LUNGS: Clear  ABDOMEN: Soft, NT, BS+  EXTREMITIES: No cyanosis, edema  : No olivia catheter  NEURO: AAO X 3  PSY: Normal affect      Medication List with Changes/Refills   Current Medications    ALPRAZOLAM (XANAX) 1 MG TABLET    Take 1 mg by mouth once daily.    AMLODIPINE (NORVASC) 2.5 MG TABLET    Take 1 tablet (2.5 mg total) by mouth 2 (two) times daily.    ATENOLOL (TENORMIN) 25 MG TABLET    Take 1 tablet (25 mg total) by mouth once daily.    ATORVASTATIN (LIPITOR) 80 MG TABLET    Take 80 mg by mouth.    CLOPIDOGREL (PLAVIX) 75 MG TABLET    TAKE (1) TABLET BY MOUTH ONCE DAILY.    CLOPIDOGREL (PLAVIX) 75 MG TABLET    Take 1 tablet (75 mg total) by mouth once daily.    CYANOCOBALAMIN, VITAMIN B-12, 1,000 MCG/15 ML LIQD    Take by mouth.    DULOXETINE (CYMBALTA) 30 MG CAPSULE    Take 30 mg by mouth every evening.    GLIMEPIRIDE (AMARYL) 1 MG TABLET    Take 1 mg by mouth once daily.    IBUPROFEN (ADVIL,MOTRIN) 600 MG TABLET    Take 600 mg by mouth 3 (three) times daily.    JANUMET  MG PER TABLET    Take 1 tablet by mouth 2 (two) times daily.    LOSARTAN (COZAAR) 50 MG TABLET    Take 1 tablet (50 mg total) by mouth once daily.    MELATONIN 1 MG TBDL    Take by mouth.    NITROGLYCERIN (NITROSTAT) 0.4 MG SL TABLET    Place 1 tablet (0.4 mg total) under the tongue every 5 (five) minutes as needed for Chest pain.    OMEPRAZOLE (PRILOSEC) 10 MG CAPSULE    Take 10 mg by mouth once daily.    PIOGLITAZONE (ACTOS) 15 MG TABLET    Take 15 mg by mouth once daily.    SYNTHROID 88 MCG TABLET    Take 88 mcg by mouth once daily.    TRUE METRIX GLUCOSE TEST STRIP STRP    SMARTSIG:Via Meter    TRUEPLUS LANCETS 28 GAUGE MISC    Apply topically 3 (three) times daily.    Discontinued Medications    ATENOLOL (TENORMIN) 25 MG TABLET    TAKE (1) TABLET BY MOUTH ONCE DAILY.             Assessment:       1. CAD in native artery    2. Hx of CABG    3. Essential hypertension    4. Mixed hyperlipidemia    5. Acquired hypothyroidism    6. Esophageal stricture    7. Type 2 diabetes mellitus with diabetic neuropathy, without long-term current use of insulin    8. Nonspecific abnormal electrocardiogram (ECG) (EKG)    9. Atherosclerosis of native coronary artery of native heart with angina pectoris         Plan:   1. CAD status post CABG.  Patient is doing well she is currently on Plavix 75 mg p.o. daily continue the same.  2. Essential hypertension   Her blood pressure is stable at 130 2/74 and she is on losartan 50 mg p.o. daily atenolol 25 mg p.o. daily amlodipine 2.5 mg p.o. b.i.d..  Continue current management.  3. Mixed hyperlipidemia   She is on atorvastatin 80 mg p.o. daily continue the same.  And she follows up with the primary physician is checking her cholesterol and liver function tests.  4. Acquired hypothyroidism   She is currently on Synthroid 88 mcg p.o. daily continue the same.  And she follows up with the primary physician is checking her thyroid function tests.  5. Esophageal stricture  She follows up with the gastroenterologist.  It was 6.  Diabetes mellitus   She is currently on Actos 15 mg p.o. daily Janumet  mg p.o. daily continue the same she is also on Amaryl 1 mg p.o. daily.  6. EKG   Reviewed EKG independently patient is in normal sinus rhythm with a heart rate of 66 beats per minute incomplete right bundle branch block normal intervals no acute ST T-wave changes.  7. Patient to continue current management I will see him back in the office in 6 months time.                Problem List Items Addressed This Visit          Cardiac/Vascular    Atherosclerosis of native coronary artery of native heart with angina pectoris    Hx of CABG    Mixed hyperlipidemia     Essential hypertension    Nonspecific abnormal electrocardiogram (ECG) (EKG)    Relevant Orders    IN OFFICE EKG 12-LEAD (to [facility])       Endocrine    Type 2 diabetes mellitus with diabetic neuropathy, without long-term current use of insulin    Acquired hypothyroidism       GI    Esophageal stricture     Other Visit Diagnoses       CAD in native artery    -  Primary            No follow-ups on file.

## 2024-08-27 LAB
OHS QRS DURATION: 106 MS
OHS QTC CALCULATION: 452 MS

## 2024-10-22 ENCOUNTER — TELEPHONE (OUTPATIENT)
Dept: CARDIOLOGY | Facility: CLINIC | Age: 79
End: 2024-10-22
Payer: MEDICARE

## 2024-10-22 NOTE — TELEPHONE ENCOUNTER
----- Message from Rosina sent at 10/22/2024  9:58 AM CDT -----  Contact: self  Type:  Needs Medical Advice    Who Called: self  Symptoms (please be specific): pt sts she thinks she left her ID, Insurance cards etc at the office and was wanting to know if they saw anything, the pt was there 3 weeks ago. Please call pt for more info.    Would the patient rather a call back or a response via [facility]? call  Best Call Back Number: [phone] or [phone] (home)     Additional Information: please advise and thank you.

## 2025-01-23 RX ORDER — ATENOLOL 25 MG/1
TABLET ORAL
Qty: 90 TABLET | Refills: 0 | Status: SHIPPED | OUTPATIENT
Start: 2025-01-23